# Patient Record
Sex: MALE | Race: WHITE | NOT HISPANIC OR LATINO | Employment: FULL TIME | RURAL
[De-identification: names, ages, dates, MRNs, and addresses within clinical notes are randomized per-mention and may not be internally consistent; named-entity substitution may affect disease eponyms.]

---
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
## 2022-02-17 ENCOUNTER — OFFICE VISIT (OUTPATIENT)
Dept: FAMILY MEDICINE | Facility: CLINIC | Age: 22
End: 2022-02-17
Payer: MEDICAID

## 2022-02-17 VITALS
SYSTOLIC BLOOD PRESSURE: 104 MMHG | HEIGHT: 76 IN | BODY MASS INDEX: 22.53 KG/M2 | RESPIRATION RATE: 20 BRPM | WEIGHT: 185 LBS | TEMPERATURE: 98 F | HEART RATE: 61 BPM | OXYGEN SATURATION: 94 % | DIASTOLIC BLOOD PRESSURE: 60 MMHG

## 2022-02-17 DIAGNOSIS — R09.81 HEAD CONGESTION: ICD-10-CM

## 2022-02-17 DIAGNOSIS — J06.9 UPPER RESPIRATORY TRACT INFECTION, UNSPECIFIED TYPE: Primary | ICD-10-CM

## 2022-02-17 DIAGNOSIS — R09.89 CHEST CONGESTION: ICD-10-CM

## 2022-02-17 DIAGNOSIS — R05.9 COUGH: ICD-10-CM

## 2022-02-17 DIAGNOSIS — J02.9 SORE THROAT: ICD-10-CM

## 2022-02-17 PROCEDURE — 96372 PR INJECTION,THERAP/PROPH/DIAG2ST, IM OR SUBCUT: ICD-10-PCS | Mod: ,,, | Performed by: NURSE PRACTITIONER

## 2022-02-17 PROCEDURE — 3008F BODY MASS INDEX DOCD: CPT | Mod: CPTII,,, | Performed by: NURSE PRACTITIONER

## 2022-02-17 PROCEDURE — 96372 THER/PROPH/DIAG INJ SC/IM: CPT | Mod: ,,, | Performed by: NURSE PRACTITIONER

## 2022-02-17 PROCEDURE — 3074F SYST BP LT 130 MM HG: CPT | Mod: CPTII,,, | Performed by: NURSE PRACTITIONER

## 2022-02-17 PROCEDURE — 99213 PR OFFICE/OUTPT VISIT, EST, LEVL III, 20-29 MIN: ICD-10-PCS | Mod: 25,,, | Performed by: NURSE PRACTITIONER

## 2022-02-17 PROCEDURE — 1159F MED LIST DOCD IN RCRD: CPT | Mod: CPTII,,, | Performed by: NURSE PRACTITIONER

## 2022-02-17 PROCEDURE — 1160F RVW MEDS BY RX/DR IN RCRD: CPT | Mod: CPTII,,, | Performed by: NURSE PRACTITIONER

## 2022-02-17 PROCEDURE — 1160F PR REVIEW ALL MEDS BY PRESCRIBER/CLIN PHARMACIST DOCUMENTED: ICD-10-PCS | Mod: CPTII,,, | Performed by: NURSE PRACTITIONER

## 2022-02-17 PROCEDURE — 1159F PR MEDICATION LIST DOCUMENTED IN MEDICAL RECORD: ICD-10-PCS | Mod: CPTII,,, | Performed by: NURSE PRACTITIONER

## 2022-02-17 PROCEDURE — 3008F PR BODY MASS INDEX (BMI) DOCUMENTED: ICD-10-PCS | Mod: CPTII,,, | Performed by: NURSE PRACTITIONER

## 2022-02-17 PROCEDURE — 3074F PR MOST RECENT SYSTOLIC BLOOD PRESSURE < 130 MM HG: ICD-10-PCS | Mod: CPTII,,, | Performed by: NURSE PRACTITIONER

## 2022-02-17 PROCEDURE — 99213 OFFICE O/P EST LOW 20 MIN: CPT | Mod: 25,,, | Performed by: NURSE PRACTITIONER

## 2022-02-17 PROCEDURE — 3078F DIAST BP <80 MM HG: CPT | Mod: CPTII,,, | Performed by: NURSE PRACTITIONER

## 2022-02-17 PROCEDURE — 3078F PR MOST RECENT DIASTOLIC BLOOD PRESSURE < 80 MM HG: ICD-10-PCS | Mod: CPTII,,, | Performed by: NURSE PRACTITIONER

## 2022-02-17 RX ORDER — AZITHROMYCIN 250 MG/1
TABLET, FILM COATED ORAL
Qty: 6 TABLET | Refills: 0 | Status: SHIPPED | OUTPATIENT
Start: 2022-02-17 | End: 2022-02-22

## 2022-02-17 RX ORDER — CEFTRIAXONE 1 G/1
1 INJECTION, POWDER, FOR SOLUTION INTRAMUSCULAR; INTRAVENOUS
Status: COMPLETED | OUTPATIENT
Start: 2022-02-17 | End: 2022-02-17

## 2022-02-17 RX ORDER — DEXAMETHASONE SODIUM PHOSPHATE 4 MG/ML
4 INJECTION, SOLUTION INTRA-ARTICULAR; INTRALESIONAL; INTRAMUSCULAR; INTRAVENOUS; SOFT TISSUE
Status: COMPLETED | OUTPATIENT
Start: 2022-02-17 | End: 2022-02-17

## 2022-02-17 RX ADMIN — DEXAMETHASONE SODIUM PHOSPHATE 4 MG: 4 INJECTION, SOLUTION INTRA-ARTICULAR; INTRALESIONAL; INTRAMUSCULAR; INTRAVENOUS; SOFT TISSUE at 04:02

## 2022-02-17 RX ADMIN — CEFTRIAXONE 1 G: 1 INJECTION, POWDER, FOR SOLUTION INTRAMUSCULAR; INTRAVENOUS at 04:02

## 2022-02-17 NOTE — LETTER
February 17, 2022      [facility]  [address]  [place] [address]  Phone: [phone]  Fax: [phone]       Patient: Ba Ewing   YOB: 2000  Date of Visit: 02/17/2022    To Whom It May Concern:    Damaris Ewing  was at [facility] on 02/17/2022. The patient may return to work/school on 02/18/22 with no restrictions. If you have any questions or concerns, or if I can be of further assistance, please do not hesitate to contact me.    Sincerely,      GARRETT Lira

## 2022-02-17 NOTE — LETTER
February 17, 2022      [facility]  [address]  [place] [address]  Phone: [phone]  Fax: [phone]       Patient: Ba Ewing   YOB: 2000  Date of Visit: 02/17/2022    To Whom It May Concern:    Damaris Ewing  was at [facility] on 02/17/2022. The patient may return to work/school on 02/18/2022 with no restrictions. If you have any questions or concerns, or if I can be of further assistance, please do not hesitate to contact me.    Sincerely,    Lisa Medina RN

## 2022-02-22 NOTE — PROGRESS NOTES
GARRETT Payne   [facility]  [address] [address] [address]  [phone]      PATIENT NAME: Ba Ewing  : 2000  DATE: 22  MRN: [id number]      Billing Provider: GARRETT Payne  Level of Service:   Patient PCP Information     Provider PCP Type    [facility]RETT [facility]          Reason for Visit / Chief Complaint: Sore Throat (Sick for about the past month or so.), Headache (Denies fever, n/v/d or loss of smell or taste.), and Cough (Productive cough with yellow sputum.)       Update PCP  Update Chief Complaint         History of Present Illness / Problem Focused Workflow       Presents with complaints of sore throat, headache, head congestion, cough for almost a month      Review of Systems     Review of Systems   Constitutional: Negative for chills, fatigue and fever.   HENT: Positive for congestion and sore throat. Negative for ear pain.    Respiratory: Positive for cough. Negative for shortness of breath.    Cardiovascular: Negative for palpitations.   Gastrointestinal: Negative for abdominal pain, diarrhea and nausea.   Musculoskeletal: Negative for gait problem.   Skin: Negative for rash.   Neurological: Negative for dizziness and weakness.   Psychiatric/Behavioral: Negative for dysphoric mood. The patient is not nervous/anxious.        Medical / Social / Family History     Past Medical History:   Diagnosis Date    Asthma     History of COVID-19 2020       Past Surgical History:   Procedure Laterality Date    MOUTH SURGERY         Social History    reports that he has been smoking vaping with nicotine. He has never used smokeless tobacco. He reports current drug use. Drug: Marijuana. He reports that he does not drink alcohol.    Family History  's family history includes Hypertension in his father and mother.    Medications and Allergies     Medications  Outpatient Medications Marked as Taking for the 22 encounter (Office Visit)  with [facility]   Medication Sig Dispense Refill    omeprazole (PRILOSEC) 20 MG capsule Take 20 mg by mouth once daily.         Allergies  Review of patient's allergies indicates:   Allergen Reactions    Amoxicillin-pot clavulanate Other (See Comments)       Physical Examination     Vitals:    02/17/22 1606   BP: 104/60   Pulse: 61   Resp: 20   Temp: 97.9 °F (36.6 °C)     Physical Exam  Constitutional:       General: He is not in acute distress.  HENT:      Head: Normocephalic.      Right Ear: Tympanic membrane normal.      Left Ear: Tympanic membrane normal.      Nose: Congestion present.      Mouth/Throat:      Mouth: Mucous membranes are moist.      Pharynx: No posterior oropharyngeal erythema.   Eyes:      Extraocular Movements: Extraocular movements intact.   Cardiovascular:      Rate and Rhythm: Normal rate.      Heart sounds: Normal heart sounds.   Pulmonary:      Effort: Pulmonary effort is normal. No respiratory distress.   Abdominal:      General: Bowel sounds are normal.   Musculoskeletal:         General: Normal range of motion.      Cervical back: Normal range of motion.   Skin:     General: Skin is warm.   Neurological:      Mental Status: He is alert and oriented to person, place, and time.   Psychiatric:         Behavior: Behavior normal.           Imaging / Labs     No visits with results within 1 Day(s) from this visit.   Latest known visit with results is:   Office Visit on 09/23/2021   Component Date Value Ref Range Status    SARS Coronavirus 2 Antigen 09/23/2021 Negative  Negative Final    Rapid Influenza A Ag 09/23/2021 Negative  Negative Final    Rapid Influenza B Ag 09/23/2021 Negative  Negative Final     Acceptable 09/23/2021 Yes   Final     X-Ray Elbow Complete Left  Narrative: EXAMINATION:  XR ELBOW COMPLETE 3 VIEW LEFT    CLINICAL HISTORY:  Unspecified fall, initial encounter    COMPARISON:  None available    FINDINGS:  No evidence of fracture seen.  The  alignment of the joints appears normal.  No degenerative change is present.  No soft tissue abnormality is seen.  Impression: No evidence of abnormality demonstrated    Electronically signed by: Ryan Clemons  Date:    09/08/2021  Time:    07:52      Assessment and Plan (including Health Maintenance)      Problem List  Smart Sets  Document Outside HM   :    Health Maintenance Due   Topic Date Due    Hepatitis C Screening  Never done    Lipid Panel  Never done    COVID-19 Vaccine (1) Never done    Pneumococcal Vaccines (Age 0-64) (1 of 2 - PPSV23) Never done    HPV Vaccines (1 - Male 2-dose series) Never done    HIV Screening  Never done    TETANUS VACCINE  Never done    Influenza Vaccine (1) 09/01/2021       Problem List Items Addressed This Visit        ENT    Upper respiratory tract infection - Primary    Relevant Medications    azithromycin (Z-NICKY) 250 MG tablet      Other Visit Diagnoses     Sore throat        Relevant Orders    POCT SARS-COV2 (COVID) with Flu Antigen    POCT rapid strep A    Cough        Relevant Medications    dexamethasone injection 4 mg (Completed)    Other Relevant Orders    POCT SARS-COV2 (COVID) with Flu Antigen    Head congestion        Relevant Medications    dexamethasone injection 4 mg (Completed)    Chest congestion        Relevant Medications    dexamethasone injection 4 mg (Completed)        Will treat with decadron IM today. Advised him to call if symptoms change or seem to worsen  Follow up as needed    Signature:  GARRETT Payne  [address] [address]  [phone]    Date of encounter: 2/17/22

## 2022-05-25 ENCOUNTER — HOSPITAL ENCOUNTER (EMERGENCY)
Facility: HOSPITAL | Age: 22
Discharge: HOME | End: 2022-05-25
Payer: MEDICAID

## 2022-05-25 VITALS
SYSTOLIC BLOOD PRESSURE: 125 MMHG | RESPIRATION RATE: 16 BRPM | HEIGHT: 77 IN | HEART RATE: 84 BPM | DIASTOLIC BLOOD PRESSURE: 75 MMHG | BODY MASS INDEX: 21.25 KG/M2 | WEIGHT: 180 LBS | TEMPERATURE: 98 F | OXYGEN SATURATION: 100 %

## 2022-05-25 DIAGNOSIS — N39.0 URINARY TRACT INFECTION WITHOUT HEMATURIA, SITE UNSPECIFIED: Primary | ICD-10-CM

## 2022-05-25 LAB
AMORPH PHOS CRY #/AREA URNS LPF: ABNORMAL /LPF
BACTERIA #/AREA URNS HPF: ABNORMAL /HPF
BILIRUB UR QL STRIP: NEGATIVE
CLARITY UR: CLEAR
COLOR UR: YELLOW
GLUCOSE UR STRIP-MCNC: NEGATIVE MG/DL
KETONES UR STRIP-SCNC: NEGATIVE MG/DL
LEUKOCYTE ESTERASE UR QL STRIP: ABNORMAL
NITRITE UR QL STRIP: POSITIVE
PH UR STRIP: 7.5 PH UNITS
PROT UR QL STRIP: NEGATIVE
RBC # UR STRIP: NEGATIVE /UL
RBC #/AREA URNS HPF: ABNORMAL /HPF
SP GR UR STRIP: 1.01
SQUAMOUS #/AREA URNS LPF: ABNORMAL /LPF
UROBILINOGEN UR STRIP-ACNC: 0.2 MG/DL
WBC #/AREA URNS HPF: ABNORMAL /HPF

## 2022-05-25 PROCEDURE — 99283 PR EMERGENCY DEPT VISIT,LEVEL III: ICD-10-PCS | Mod: ,,, | Performed by: REGISTERED NURSE

## 2022-05-25 PROCEDURE — 99283 EMERGENCY DEPT VISIT LOW MDM: CPT | Mod: ,,, | Performed by: REGISTERED NURSE

## 2022-05-25 PROCEDURE — 96372 THER/PROPH/DIAG INJ SC/IM: CPT

## 2022-05-25 PROCEDURE — 87491 CHLMYD TRACH DNA AMP PROBE: CPT | Performed by: REGISTERED NURSE

## 2022-05-25 PROCEDURE — 87591 N.GONORRHOEAE DNA AMP PROB: CPT | Performed by: REGISTERED NURSE

## 2022-05-25 PROCEDURE — 81001 URINALYSIS AUTO W/SCOPE: CPT | Performed by: REGISTERED NURSE

## 2022-05-25 PROCEDURE — 63600175 PHARM REV CODE 636 W HCPCS: Performed by: REGISTERED NURSE

## 2022-05-25 PROCEDURE — 99284 EMERGENCY DEPT VISIT MOD MDM: CPT

## 2022-05-25 RX ORDER — DOXYCYCLINE 100 MG/1
100 CAPSULE ORAL 2 TIMES DAILY
Qty: 14 CAPSULE | Refills: 0 | Status: SHIPPED | OUTPATIENT
Start: 2022-05-25 | End: 2022-06-01

## 2022-05-25 RX ORDER — CEFTRIAXONE 500 MG/1
500 INJECTION, POWDER, FOR SOLUTION INTRAMUSCULAR; INTRAVENOUS
Status: COMPLETED | OUTPATIENT
Start: 2022-05-25 | End: 2022-05-25

## 2022-05-25 RX ADMIN — CEFTRIAXONE 500 MG: 500 INJECTION, POWDER, FOR SOLUTION INTRAMUSCULAR; INTRAVENOUS at 08:05

## 2022-05-26 NOTE — ED TRIAGE NOTES
C/O paina nd itching with urination x 1 month.  Noted clear discharge from urethra.  Denies passing any blood.

## 2022-05-27 ENCOUNTER — TELEPHONE (OUTPATIENT)
Dept: EMERGENCY MEDICINE | Facility: HOSPITAL | Age: 22
End: 2022-05-27
Payer: MEDICAID

## 2022-05-27 LAB
CHLAMYDIA BY PCR: POSITIVE
N. GONORRHOEAE (GC) BY PCR: NEGATIVE

## 2022-10-05 ENCOUNTER — HOSPITAL ENCOUNTER (EMERGENCY)
Facility: HOSPITAL | Age: 22
Discharge: HOME | End: 2022-10-05
Payer: MEDICAID

## 2022-10-05 VITALS
DIASTOLIC BLOOD PRESSURE: 78 MMHG | WEIGHT: 175 LBS | TEMPERATURE: 98 F | OXYGEN SATURATION: 99 % | SYSTOLIC BLOOD PRESSURE: 128 MMHG | HEART RATE: 70 BPM | BODY MASS INDEX: 20.66 KG/M2 | HEIGHT: 77 IN | RESPIRATION RATE: 16 BRPM

## 2022-10-05 DIAGNOSIS — M79.642 LEFT HAND PAIN: ICD-10-CM
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
## 2022-10-05 DIAGNOSIS — S51.812A LACERATION OF LEFT FOREARM, INITIAL ENCOUNTER: ICD-10-CM

## 2022-10-05 DIAGNOSIS — S61.412A LACERATION OF LEFT HAND WITHOUT FOREIGN BODY, INITIAL ENCOUNTER: Primary | ICD-10-CM

## 2022-10-05 DIAGNOSIS — S40.812A ABRASION OF MULTIPLE SITES OF LEFT UPPER ARM, INITIAL ENCOUNTER: ICD-10-CM

## 2022-10-05 DIAGNOSIS — M79.602 LEFT ARM PAIN: ICD-10-CM

## 2022-10-05 PROCEDURE — 63600175 PHARM REV CODE 636 W HCPCS: Performed by: NURSE PRACTITIONER

## 2022-10-05 PROCEDURE — 99283 EMERGENCY DEPT VISIT LOW MDM: CPT | Mod: 25,,, | Performed by: NURSE PRACTITIONER

## 2022-10-05 PROCEDURE — 12002 RPR S/N/AX/GEN/TRNK2.6-7.5CM: CPT

## 2022-10-05 PROCEDURE — 25000003 PHARM REV CODE 250: Performed by: NURSE PRACTITIONER

## 2022-10-05 PROCEDURE — 90715 TDAP VACCINE 7 YRS/> IM: CPT | Performed by: NURSE PRACTITIONER

## 2022-10-05 PROCEDURE — 99283 PR EMERGENCY DEPT VISIT,LEVEL III: ICD-10-PCS | Mod: 25,,, | Performed by: NURSE PRACTITIONER

## 2022-10-05 PROCEDURE — 12002 PR RESUP NPTERF WND BODY 2.6-7.5 CM: ICD-10-PCS | Mod: ,,, | Performed by: NURSE PRACTITIONER

## 2022-10-05 PROCEDURE — 99285 EMERGENCY DEPT VISIT HI MDM: CPT | Mod: 25

## 2022-10-05 PROCEDURE — 90471 IMMUNIZATION ADMIN: CPT | Performed by: NURSE PRACTITIONER

## 2022-10-05 PROCEDURE — 12002 RPR S/N/AX/GEN/TRNK2.6-7.5CM: CPT | Mod: ,,, | Performed by: NURSE PRACTITIONER

## 2022-10-05 RX ORDER — LIDOCAINE HYDROCHLORIDE 10 MG/ML
5 INJECTION, SOLUTION EPIDURAL; INFILTRATION; INTRACAUDAL; PERINEURAL
Status: COMPLETED | OUTPATIENT
Start: 2022-10-05 | End: 2022-10-05

## 2022-10-05 RX ORDER — CLINDAMYCIN HYDROCHLORIDE 150 MG/1
300 CAPSULE ORAL EVERY 8 HOURS
Qty: 42 CAPSULE | Refills: 0 | Status: SHIPPED | OUTPATIENT
Start: 2022-10-05 | End: 2022-10-12

## 2022-10-05 RX ORDER — TRAMADOL HYDROCHLORIDE 50 MG/1
50 TABLET ORAL
Status: COMPLETED | OUTPATIENT
Start: 2022-10-05 | End: 2022-10-05

## 2022-10-05 RX ORDER — KETOROLAC TROMETHAMINE 10 MG/1
10 TABLET, FILM COATED ORAL EVERY 6 HOURS PRN
Qty: 20 TABLET | Refills: 0 | Status: SHIPPED | OUTPATIENT
Start: 2022-10-05 | End: 2022-10-10

## 2022-10-05 RX ADMIN — TETANUS TOXOID, REDUCED DIPHTHERIA TOXOID AND ACELLULAR PERTUSSIS VACCINE, ADSORBED 0.5 ML: 5; 2.5; 8; 8; 2.5 SUSPENSION INTRAMUSCULAR at 01:10

## 2022-10-05 RX ADMIN — TRAMADOL HYDROCHLORIDE 50 MG: 50 TABLET, COATED ORAL at 01:10

## 2022-10-05 RX ADMIN — LIDOCAINE HYDROCHLORIDE 50 MG: 10 INJECTION, SOLUTION EPIDURAL; INFILTRATION; INTRACAUDAL; PERINEURAL at 01:10

## 2022-10-05 NOTE — DISCHARGE INSTRUCTIONS
Take prescriptions as directed. Alternate with tylenol as needed for pain. Keep wound clean and dry. Wash with antibacterial soap and water twice a day. Do not pull steri strips, they will come off on their own. You may return in 2 days for a wound check. Return in 7 days for suture removal. Return to the ED for worsening signs and symptoms or otherwise as needed.
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
## 2022-10-06 ENCOUNTER — TELEPHONE (OUTPATIENT)
Dept: EMERGENCY MEDICINE | Facility: HOSPITAL | Age: 22
End: 2022-10-06
Payer: MEDICAID

## 2022-10-07 ENCOUNTER — OFFICE VISIT (OUTPATIENT)
Dept: FAMILY MEDICINE | Facility: CLINIC | Age: 22
End: 2022-10-07
Payer: MEDICAID

## 2022-10-07 VITALS
HEART RATE: 65 BPM | DIASTOLIC BLOOD PRESSURE: 82 MMHG | TEMPERATURE: 98 F | RESPIRATION RATE: 18 BRPM | OXYGEN SATURATION: 99 % | BODY MASS INDEX: 21.33 KG/M2 | HEIGHT: 77 IN | WEIGHT: 180.63 LBS | SYSTOLIC BLOOD PRESSURE: 130 MMHG

## 2022-10-07 DIAGNOSIS — S41.112D LACERATION OF MULTIPLE SITES OF LEFT UPPER EXTREMITY, SUBSEQUENT ENCOUNTER: Primary | ICD-10-CM

## 2022-10-07 PROCEDURE — 1159F PR MEDICATION LIST DOCUMENTED IN MEDICAL RECORD: ICD-10-PCS | Mod: CPTII,,, | Performed by: FAMILY MEDICINE

## 2022-10-07 PROCEDURE — 99213 PR OFFICE/OUTPT VISIT, EST, LEVL III, 20-29 MIN: ICD-10-PCS | Mod: ,,, | Performed by: FAMILY MEDICINE

## 2022-10-07 PROCEDURE — 3079F PR MOST RECENT DIASTOLIC BLOOD PRESSURE 80-89 MM HG: ICD-10-PCS | Mod: CPTII,,, | Performed by: FAMILY MEDICINE

## 2022-10-07 PROCEDURE — 1159F MED LIST DOCD IN RCRD: CPT | Mod: CPTII,,, | Performed by: FAMILY MEDICINE

## 2022-10-07 PROCEDURE — 3008F PR BODY MASS INDEX (BMI) DOCUMENTED: ICD-10-PCS | Mod: CPTII,,, | Performed by: FAMILY MEDICINE

## 2022-10-07 PROCEDURE — 3075F SYST BP GE 130 - 139MM HG: CPT | Mod: CPTII,,, | Performed by: FAMILY MEDICINE

## 2022-10-07 PROCEDURE — 99213 OFFICE O/P EST LOW 20 MIN: CPT | Mod: ,,, | Performed by: FAMILY MEDICINE

## 2022-10-07 PROCEDURE — 3075F PR MOST RECENT SYSTOLIC BLOOD PRESS GE 130-139MM HG: ICD-10-PCS | Mod: CPTII,,, | Performed by: FAMILY MEDICINE

## 2022-10-07 PROCEDURE — 3079F DIAST BP 80-89 MM HG: CPT | Mod: CPTII,,, | Performed by: FAMILY MEDICINE

## 2022-10-07 PROCEDURE — 3008F BODY MASS INDEX DOCD: CPT | Mod: CPTII,,, | Performed by: FAMILY MEDICINE

## 2022-10-11 PROBLEM — S41.112A LACERATIONS OF MULTIPLE SITES OF LEFT ARM: Status: ACTIVE | Noted: 2022-10-11

## 2022-10-12 ENCOUNTER — OFFICE VISIT (OUTPATIENT)
Dept: FAMILY MEDICINE | Facility: CLINIC | Age: 22
End: 2022-10-12
Payer: MEDICAID

## 2022-10-12 VITALS
OXYGEN SATURATION: 99 % | BODY MASS INDEX: 21.02 KG/M2 | SYSTOLIC BLOOD PRESSURE: 122 MMHG | DIASTOLIC BLOOD PRESSURE: 70 MMHG | RESPIRATION RATE: 19 BRPM | TEMPERATURE: 98 F | HEIGHT: 77 IN | WEIGHT: 178 LBS | HEART RATE: 67 BPM

## 2022-10-12 DIAGNOSIS — S41.112S: Primary | ICD-10-CM

## 2022-10-12 PROCEDURE — 1159F MED LIST DOCD IN RCRD: CPT | Mod: CPTII,,, | Performed by: FAMILY MEDICINE

## 2022-10-12 PROCEDURE — 3008F BODY MASS INDEX DOCD: CPT | Mod: CPTII,,, | Performed by: FAMILY MEDICINE

## 2022-10-12 PROCEDURE — 99213 PR OFFICE/OUTPT VISIT, EST, LEVL III, 20-29 MIN: ICD-10-PCS | Mod: ,,, | Performed by: FAMILY MEDICINE

## 2022-10-12 PROCEDURE — 3074F PR MOST RECENT SYSTOLIC BLOOD PRESSURE < 130 MM HG: ICD-10-PCS | Mod: CPTII,,, | Performed by: FAMILY MEDICINE

## 2022-10-12 PROCEDURE — 3078F DIAST BP <80 MM HG: CPT | Mod: CPTII,,, | Performed by: FAMILY MEDICINE

## 2022-10-12 PROCEDURE — 3074F SYST BP LT 130 MM HG: CPT | Mod: CPTII,,, | Performed by: FAMILY MEDICINE

## 2022-10-12 PROCEDURE — 3078F PR MOST RECENT DIASTOLIC BLOOD PRESSURE < 80 MM HG: ICD-10-PCS | Mod: CPTII,,, | Performed by: FAMILY MEDICINE

## 2022-10-12 PROCEDURE — 1159F PR MEDICATION LIST DOCUMENTED IN MEDICAL RECORD: ICD-10-PCS | Mod: CPTII,,, | Performed by: FAMILY MEDICINE

## 2022-10-12 PROCEDURE — 3008F PR BODY MASS INDEX (BMI) DOCUMENTED: ICD-10-PCS | Mod: CPTII,,, | Performed by: FAMILY MEDICINE

## 2022-10-12 PROCEDURE — 99213 OFFICE O/P EST LOW 20 MIN: CPT | Mod: ,,, | Performed by: FAMILY MEDICINE
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
## 2022-10-12 NOTE — ASSESSMENT & PLAN NOTE
Lacerations healing with the sutures removed from 3 areas.  He still has macerated open lacerations on the dorsum of his left hand.  No evidence of infection.  He continues antibiotics.  Follow-up here in 1 week.  Daily wound care with the Neosporin and Telfa.

## 2022-10-12 NOTE — ASSESSMENT & PLAN NOTE
Multiple superficial lacerations and abrasions to the left arm.  Wounds are currently stable and do not appear to be infected.  Will maintain him on clindamycin and treat the wounds daily with the wound dressings.  We did the clean the wounds up in the clinic today.  In redressed his wounds.  Continue Toradol for pain and clindamycin every 8 hours.  Follow-up in 1 week or p.r.n.

## 2022-10-19 ENCOUNTER — OFFICE VISIT (OUTPATIENT)
Dept: FAMILY MEDICINE | Facility: CLINIC | Age: 22
End: 2022-10-19
Payer: MEDICAID

## 2022-10-19 VITALS
SYSTOLIC BLOOD PRESSURE: 116 MMHG | TEMPERATURE: 99 F | RESPIRATION RATE: 16 BRPM | BODY MASS INDEX: 20.66 KG/M2 | OXYGEN SATURATION: 99 % | DIASTOLIC BLOOD PRESSURE: 72 MMHG | WEIGHT: 175 LBS | HEART RATE: 68 BPM | HEIGHT: 77 IN

## 2022-10-19 DIAGNOSIS — S41.112S: ICD-10-CM

## 2022-10-19 DIAGNOSIS — S16.1XXD STRAIN OF NECK MUSCLE, SUBSEQUENT ENCOUNTER: Primary | ICD-10-CM

## 2022-10-19 PROBLEM — S16.1XXA CERVICAL STRAIN: Status: ACTIVE | Noted: 2022-10-19

## 2022-10-19 PROCEDURE — 1159F MED LIST DOCD IN RCRD: CPT | Mod: CPTII,,, | Performed by: FAMILY MEDICINE

## 2022-10-19 PROCEDURE — 3078F DIAST BP <80 MM HG: CPT | Mod: CPTII,,, | Performed by: FAMILY MEDICINE

## 2022-10-19 PROCEDURE — 1159F PR MEDICATION LIST DOCUMENTED IN MEDICAL RECORD: ICD-10-PCS | Mod: CPTII,,, | Performed by: FAMILY MEDICINE

## 2022-10-19 PROCEDURE — 99214 OFFICE O/P EST MOD 30 MIN: CPT | Mod: ,,, | Performed by: FAMILY MEDICINE

## 2022-10-19 PROCEDURE — 3074F PR MOST RECENT SYSTOLIC BLOOD PRESSURE < 130 MM HG: ICD-10-PCS | Mod: CPTII,,, | Performed by: FAMILY MEDICINE

## 2022-10-19 PROCEDURE — 1160F PR REVIEW ALL MEDS BY PRESCRIBER/CLIN PHARMACIST DOCUMENTED: ICD-10-PCS | Mod: CPTII,,, | Performed by: FAMILY MEDICINE

## 2022-10-19 PROCEDURE — 1160F RVW MEDS BY RX/DR IN RCRD: CPT | Mod: CPTII,,, | Performed by: FAMILY MEDICINE

## 2022-10-19 PROCEDURE — 3078F PR MOST RECENT DIASTOLIC BLOOD PRESSURE < 80 MM HG: ICD-10-PCS | Mod: CPTII,,, | Performed by: FAMILY MEDICINE

## 2022-10-19 PROCEDURE — 99214 PR OFFICE/OUTPT VISIT, EST, LEVL IV, 30-39 MIN: ICD-10-PCS | Mod: ,,, | Performed by: FAMILY MEDICINE

## 2022-10-19 PROCEDURE — 3074F SYST BP LT 130 MM HG: CPT | Mod: CPTII,,, | Performed by: FAMILY MEDICINE

## 2022-10-19 RX ORDER — DICLOFENAC SODIUM 50 MG/1
50 TABLET, DELAYED RELEASE ORAL 2 TIMES DAILY
Qty: 20 TABLET | Refills: 1 | Status: SHIPPED | OUTPATIENT
Start: 2022-10-19

## 2022-10-19 RX ORDER — METHOCARBAMOL 500 MG/1
500 TABLET, FILM COATED ORAL 4 TIMES DAILY
Qty: 40 TABLET | Refills: 0 | Status: SHIPPED | OUTPATIENT
Start: 2022-10-19 | End: 2022-10-29
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
## 2022-11-14 ENCOUNTER — OFFICE VISIT (OUTPATIENT)
Dept: FAMILY MEDICINE | Facility: CLINIC | Age: 22
End: 2022-11-14
Payer: MEDICAID

## 2022-11-14 VITALS
OXYGEN SATURATION: 97 % | SYSTOLIC BLOOD PRESSURE: 124 MMHG | TEMPERATURE: 98 F | WEIGHT: 174 LBS | HEIGHT: 77 IN | HEART RATE: 83 BPM | DIASTOLIC BLOOD PRESSURE: 84 MMHG | BODY MASS INDEX: 20.55 KG/M2

## 2022-11-14 DIAGNOSIS — M62.838 CERVICAL PARASPINAL MUSCLE SPASM: ICD-10-CM

## 2022-11-14 DIAGNOSIS — Z72.51 HIGH RISK HETEROSEXUAL BEHAVIOR: Primary | ICD-10-CM

## 2022-11-14 PROCEDURE — 87661 TRICHOMONAS VAGINALIS BY PCR: ICD-10-PCS | Mod: ,,, | Performed by: CLINICAL MEDICAL LABORATORY

## 2022-11-14 PROCEDURE — 1159F MED LIST DOCD IN RCRD: CPT | Mod: CPTII,,, | Performed by: NURSE PRACTITIONER

## 2022-11-14 PROCEDURE — 3079F DIAST BP 80-89 MM HG: CPT | Mod: CPTII,,, | Performed by: NURSE PRACTITIONER

## 2022-11-14 PROCEDURE — 1159F PR MEDICATION LIST DOCUMENTED IN MEDICAL RECORD: ICD-10-PCS | Mod: CPTII,,, | Performed by: NURSE PRACTITIONER

## 2022-11-14 PROCEDURE — 1160F PR REVIEW ALL MEDS BY PRESCRIBER/CLIN PHARMACIST DOCUMENTED: ICD-10-PCS | Mod: CPTII,,, | Performed by: NURSE PRACTITIONER

## 2022-11-14 PROCEDURE — 87661 TRICHOMONAS VAGINALIS AMPLIF: CPT | Mod: ,,, | Performed by: CLINICAL MEDICAL LABORATORY

## 2022-11-14 PROCEDURE — 87491 CHLMYD TRACH DNA AMP PROBE: CPT | Mod: ,,, | Performed by: CLINICAL MEDICAL LABORATORY

## 2022-11-14 PROCEDURE — 87591 N.GONORRHOEAE DNA AMP PROB: CPT | Mod: ,,, | Performed by: CLINICAL MEDICAL LABORATORY

## 2022-11-14 PROCEDURE — 3074F PR MOST RECENT SYSTOLIC BLOOD PRESSURE < 130 MM HG: ICD-10-PCS | Mod: CPTII,,, | Performed by: NURSE PRACTITIONER

## 2022-11-14 PROCEDURE — 87591 CHLAMYDIA/GONORRHOEAE(GC), PCR: ICD-10-PCS | Mod: ,,, | Performed by: CLINICAL MEDICAL LABORATORY

## 2022-11-14 PROCEDURE — 3074F SYST BP LT 130 MM HG: CPT | Mod: CPTII,,, | Performed by: NURSE PRACTITIONER

## 2022-11-14 PROCEDURE — 3008F PR BODY MASS INDEX (BMI) DOCUMENTED: ICD-10-PCS | Mod: CPTII,,, | Performed by: NURSE PRACTITIONER

## 2022-11-14 PROCEDURE — 3079F PR MOST RECENT DIASTOLIC BLOOD PRESSURE 80-89 MM HG: ICD-10-PCS | Mod: CPTII,,, | Performed by: NURSE PRACTITIONER

## 2022-11-14 PROCEDURE — 1160F RVW MEDS BY RX/DR IN RCRD: CPT | Mod: CPTII,,, | Performed by: NURSE PRACTITIONER

## 2022-11-14 PROCEDURE — 99213 OFFICE O/P EST LOW 20 MIN: CPT | Mod: ,,, | Performed by: NURSE PRACTITIONER

## 2022-11-14 PROCEDURE — 87491 CHLAMYDIA/GONORRHOEAE(GC), PCR: ICD-10-PCS | Mod: ,,, | Performed by: CLINICAL MEDICAL LABORATORY

## 2022-11-14 PROCEDURE — 3008F BODY MASS INDEX DOCD: CPT | Mod: CPTII,,, | Performed by: NURSE PRACTITIONER

## 2022-11-14 PROCEDURE — 99213 PR OFFICE/OUTPT VISIT, EST, LEVL III, 20-29 MIN: ICD-10-PCS | Mod: ,,, | Performed by: NURSE PRACTITIONER

## 2022-11-14 RX ORDER — BACLOFEN 10 MG/1
10 TABLET ORAL 3 TIMES DAILY
Qty: 90 TABLET | Refills: 0 | Status: SHIPPED | OUTPATIENT
Start: 2022-11-14 | End: 2023-11-14
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
## 2022-11-14 NOTE — ASSESSMENT & PLAN NOTE
GC/Chlamydia, Trich testing done today.   Instructed we should have results tomorrow.  Safer sex practices encouraged using a barrier method. Avoid sexual activities until symptoms subside and test results are received. Instructed to follow-up if symptoms persist past treatment plan or worsen to return to clinic for further evaluation and treatment.

## 2022-11-14 NOTE — ASSESSMENT & PLAN NOTE
Baclofen TID as needed. Patient states he is not taking Diclofenac anymore as it did not seem to help and never even picked up Robaxin. Recommended aspercreme with Lidocaine and gentle stretching.

## 2022-11-15 LAB
CHLAMYDIA BY PCR: NEGATIVE
N. GONORRHOEAE (GC) BY PCR: NEGATIVE
TRICHOMONAS NAT: NEGATIVE

## 2022-11-15 NOTE — PROGRESS NOTES
Labs reviewed: Negative for Trich, Chlamydia, and Gonorrhea. Please contact patient and notify of findings and encourage him to continue safe sex practices as discussed yesterday. Thanks

## 2023-02-22 NOTE — ED PROVIDER NOTES
"Encounter Date: 5/25/2022       History     Chief Complaint   Patient presents with    Dysuria     21y-old AAM received to room 1 with complaint of penile discharge and "UTI" for the past month. Patient states that discharge is "clear." Also c/o pain/itching to penis. Patient reports 1 sexual partner without use of protection.         Review of patient's allergies indicates:   Allergen Reactions    Amoxicillin-pot clavulanate Other (See Comments)     Past Medical History:   Diagnosis Date    Asthma     History of COVID-19 07/20/2020     Past Surgical History:   Procedure Laterality Date    MOUTH SURGERY       Family History   Problem Relation Age of Onset    Hypertension Mother     Hypertension Father      Social History     Tobacco Use    Smoking status: Current Some Day Smoker     Types: Vaping with nicotine    Smokeless tobacco: Never Used   Substance Use Topics    Alcohol use: Not Currently    Drug use: Not Currently     Types: Marijuana     Review of Systems   Constitutional: Negative.    HENT: Negative.    Eyes: Negative.    Respiratory: Negative.    Cardiovascular: Negative.    Gastrointestinal: Negative.    Endocrine: Negative.    Genitourinary: Positive for dysuria, flank pain, frequency, penile discharge, penile pain and urgency. Negative for difficulty urinating and enuresis.   Skin: Negative.    Allergic/Immunologic: Negative.    Neurological: Negative.    Hematological: Negative.    Psychiatric/Behavioral: Negative.        Physical Exam     Initial Vitals [05/25/22 1938]   BP Pulse Resp Temp SpO2   125/75 84 16 98.1 °F (36.7 °C) 100 %      MAP       --         Physical Exam    Nursing note and vitals reviewed.  Constitutional: He appears well-developed and well-nourished.   HENT:   Head: Normocephalic and atraumatic.   Right Ear: External ear normal.   Left Ear: External ear normal.   Nose: Nose normal.   Mouth/Throat: Oropharynx is clear and moist.   Eyes: Conjunctivae are normal.   Neck: " Neck supple.   Normal range of motion.  Cardiovascular: Normal rate, regular rhythm, normal heart sounds and intact distal pulses.   Pulmonary/Chest: Breath sounds normal.   Abdominal: Abdomen is soft. Bowel sounds are normal.   Musculoskeletal:         General: Normal range of motion.      Cervical back: Normal range of motion and neck supple.     Neurological: He is alert and oriented to person, place, and time. He has normal strength. GCS score is 15. GCS eye subscore is 4. GCS verbal subscore is 5. GCS motor subscore is 6.   Skin: Skin is warm and dry. Capillary refill takes less than 2 seconds.         Medical Screening Exam   See Full Note    ED Course   Procedures  Labs Reviewed   URINALYSIS, REFLEX TO URINE CULTURE - Abnormal; Notable for the following components:       Result Value    Leukocytes, UA Trace (*)     Nitrites, UA Positive (*)     All other components within normal limits   URINALYSIS, MICROSCOPIC - Abnormal; Notable for the following components:    Bacteria, UA Occasional (*)     Amorphous Crystals, UA Few (*)     All other components within normal limits   CHLAMYDIA/GONORRHOEAE(GC), PCR          Imaging Results    None          Medications   cefTRIAXone injection 500 mg (has no administration in time range)     Medical Decision Making:   ED Management:  Urine is nitrite/leukocyte positive. I strongly suggest STD GC/chlamydia which will not be available as it is a send out. Will treat empirically with instructions to f/u with his PCP or the health department if symptoms persist or fail to improve. Also discussed treatment of sexual partners. Patient verbalizes understanding and agrees with plan.                    Clinical Impression:   Final diagnoses:  [N39.0] Urinary tract infection without hematuria, site unspecified (Primary)          ED Disposition Condition    Discharge Stable        ED Prescriptions     Medication Sig Dispense Start Date End Date Auth. Provider    doxycycline  (VIBRAMYCIN) 100 MG Cap Take 1 capsule (100 mg total) by mouth 2 (two) times daily. for 7 days 14 capsule 5/25/2022 6/1/2022 TEDDY Zacarias        Follow-up Information     Follow up With Specialties Details Why Contact Info    GARRETT Jacobs [facility]   [address]  [facility] [place] [address]  [phone]             TEDDY Zacarias  05/25/22 2024     22-Feb-2023 15:06

## 2023-03-28 ENCOUNTER — OFFICE VISIT (OUTPATIENT)
Dept: FAMILY MEDICINE | Facility: CLINIC | Age: 23
End: 2023-03-28
Payer: MEDICAID

## 2023-03-28 VITALS
RESPIRATION RATE: 16 BRPM | HEIGHT: 77 IN | HEART RATE: 75 BPM | SYSTOLIC BLOOD PRESSURE: 138 MMHG | BODY MASS INDEX: 20.35 KG/M2 | TEMPERATURE: 98 F | OXYGEN SATURATION: 97 % | DIASTOLIC BLOOD PRESSURE: 88 MMHG | WEIGHT: 172.38 LBS

## 2023-03-28 DIAGNOSIS — R30.0 DYSURIA: ICD-10-CM

## 2023-03-28 DIAGNOSIS — Z11.59 ENCOUNTER FOR SCREENING FOR OTHER VIRAL DISEASES: Primary | ICD-10-CM

## 2023-03-28 DIAGNOSIS — R31.9 HEMATURIA, UNSPECIFIED TYPE: ICD-10-CM

## 2023-03-28 DIAGNOSIS — R10.9 FLANK PAIN: ICD-10-CM

## 2023-03-28 LAB
BILIRUB SERPL-MCNC: NEGATIVE MG/DL
BLOOD URINE, POC: NEGATIVE
COLOR, POC UA: YELLOW
GLUCOSE UR QL STRIP: NEGATIVE
KETONES UR QL STRIP: NORMAL
LEUKOCYTE ESTERASE URINE, POC: NEGATIVE
NITRITE, POC UA: NEGATIVE
PH, POC UA: 6
PROTEIN, POC: NEGATIVE
SPECIFIC GRAVITY, POC UA: 1.02
UROBILINOGEN, POC UA: 0.2

## 2023-03-28 PROCEDURE — 87491 CHLMYD TRACH DNA AMP PROBE: CPT | Mod: ,,, | Performed by: CLINICAL MEDICAL LABORATORY

## 2023-03-28 PROCEDURE — 87491 CHLAMYDIA/GONORRHOEAE(GC), PCR: ICD-10-PCS | Mod: ,,, | Performed by: CLINICAL MEDICAL LABORATORY

## 2023-03-28 PROCEDURE — 3008F BODY MASS INDEX DOCD: CPT | Mod: CPTII,,, | Performed by: NURSE PRACTITIONER

## 2023-03-28 PROCEDURE — 87591 N.GONORRHOEAE DNA AMP PROB: CPT | Mod: ,,, | Performed by: CLINICAL MEDICAL LABORATORY

## 2023-03-28 PROCEDURE — 3079F DIAST BP 80-89 MM HG: CPT | Mod: CPTII,,, | Performed by: NURSE PRACTITIONER

## 2023-03-28 PROCEDURE — 81003 URINALYSIS AUTO W/O SCOPE: CPT | Mod: RHCUB | Performed by: NURSE PRACTITIONER

## 2023-03-28 PROCEDURE — 3075F SYST BP GE 130 - 139MM HG: CPT | Mod: CPTII,,, | Performed by: NURSE PRACTITIONER

## 2023-03-28 PROCEDURE — 3075F PR MOST RECENT SYSTOLIC BLOOD PRESS GE 130-139MM HG: ICD-10-PCS | Mod: CPTII,,, | Performed by: NURSE PRACTITIONER

## 2023-03-28 PROCEDURE — 99213 PR OFFICE/OUTPT VISIT, EST, LEVL III, 20-29 MIN: ICD-10-PCS | Mod: ,,, | Performed by: NURSE PRACTITIONER

## 2023-03-28 PROCEDURE — 3008F PR BODY MASS INDEX (BMI) DOCUMENTED: ICD-10-PCS | Mod: CPTII,,, | Performed by: NURSE PRACTITIONER

## 2023-03-28 PROCEDURE — 99213 OFFICE O/P EST LOW 20 MIN: CPT | Mod: ,,, | Performed by: NURSE PRACTITIONER

## 2023-03-28 PROCEDURE — 87591 CHLAMYDIA/GONORRHOEAE(GC), PCR: ICD-10-PCS | Mod: ,,, | Performed by: CLINICAL MEDICAL LABORATORY

## 2023-03-28 PROCEDURE — 3079F PR MOST RECENT DIASTOLIC BLOOD PRESSURE 80-89 MM HG: ICD-10-PCS | Mod: CPTII,,, | Performed by: NURSE PRACTITIONER

## 2023-03-28 NOTE — PROGRESS NOTES
03/28/23 0923   Depression Patient Health Questionnaire (PHQ-2)   Over the last two weeks how often have you been bothered by little interest or pleasure in doing things 0   Over the last two weeks how often have you been bothered by feeling down, depressed or hopeless 0   PHQ-2 Total Score 0   Depression Patient Health Questionnaire (PHQ-9)   Over the last two weeks how often have you been bothered by trouble falling or staying asleep, or sleeping too much 1   Over the last two weeks how often have you been bothered by feeling tired or having little energy 0   Over the last two weeks how often have you been bothered by a poor appetite or overeating 1   Over the last two weeks how often have you been bothered by feeling bad about yourself - or that you are a failure or have let yourself or your family down 0   Over the last two weeks how often have you been bothered by trouble concentrating on things, such as reading the newspaper or watching television 0   Over the last two weeks how often have you been bothered by moving or speaking so slowly that other people could have noticed. Or the opposite - being so fidgety or restless that you have been moving around a lot more than usual. 0   Over the last two weeks how often have you been bothered by thoughts that you would be better off dead, or of hurting yourself 0   If you checked off any problems, how difficult have these problems made it for you to do your work, take care of things at home or get along with other people? Not difficult at all   PHQ-9 Score 2   PHQ-9 Interpretation Minimal or None

## 2023-03-28 NOTE — LETTER
March 28, 2023      [facility]  [address]  [place] [address]  Phone: [phone]  Fax: [phone]       Patient: Ba Ewing   YOB: 2000  Date of Visit: 03/28/2023    To Whom It May Concern:    Damaris Ewing  was at [facility] on 03/28/2023. The patient may return to work/school on 3/29/23 with no restrictions. If you have any questions or concerns, or if I can be of further assistance, please do not hesitate to contact me.    Sincerely,    Ashley Melo NP

## 2023-03-30 LAB
CHLAMYDIA BY PCR: NEGATIVE
N. GONORRHOEAE (GC) BY PCR: NEGATIVE

## 2023-03-30 NOTE — PROGRESS NOTES
Labs reviewed: Please contact patient and notify he was negative for chlamydia and gonorrhea. If symptoms persist follow up in clinic.

## 2023-04-03 ENCOUNTER — TELEPHONE (OUTPATIENT)
Dept: FAMILY MEDICINE | Facility: CLINIC | Age: 23
End: 2023-04-03
Payer: MEDICAID

## 2023-04-03 NOTE — TELEPHONE ENCOUNTER
Patient called reporting that he still has all the symptoms of chlamydia like he did when he was positive last time. He was wanting medication called in. I told patient that his test for chlamydia and GC came back negative and Ashley said to return to clinic if he was still having symptoms. I explained to patient that it could be something else and he would need a visit to discuss. He reports he will try to come in tomorrow. I told him it is best to be here right at 8am or right after lunch as a walk-in. He voiced understanding.

## 2023-04-05 ENCOUNTER — OFFICE VISIT (OUTPATIENT)
Dept: FAMILY MEDICINE | Facility: CLINIC | Age: 23
End: 2023-04-05
Payer: MEDICAID

## 2023-04-05 VITALS
RESPIRATION RATE: 16 BRPM | TEMPERATURE: 98 F | DIASTOLIC BLOOD PRESSURE: 88 MMHG | OXYGEN SATURATION: 98 % | WEIGHT: 168.38 LBS | BODY MASS INDEX: 19.88 KG/M2 | HEIGHT: 77 IN | SYSTOLIC BLOOD PRESSURE: 128 MMHG | HEART RATE: 82 BPM

## 2023-04-05 DIAGNOSIS — M54.50 ACUTE BILATERAL LOW BACK PAIN WITHOUT SCIATICA: ICD-10-CM

## 2023-04-05 DIAGNOSIS — N41.0 ACUTE PROSTATITIS: ICD-10-CM

## 2023-04-05 DIAGNOSIS — R30.0 DYSURIA: Primary | ICD-10-CM

## 2023-04-05 LAB
ALBUMIN SERPL BCP-MCNC: 4.3 G/DL (ref 3.5–5)
ALBUMIN/GLOB SERPL: 1.2 {RATIO}
ALP SERPL-CCNC: 50 U/L (ref 45–115)
ALT SERPL W P-5'-P-CCNC: 19 U/L (ref 16–61)
ANION GAP SERPL CALCULATED.3IONS-SCNC: 10 MMOL/L (ref 7–16)
AST SERPL W P-5'-P-CCNC: 11 U/L (ref 15–37)
BASOPHILS # BLD AUTO: 0.04 K/UL (ref 0–0.2)
BASOPHILS NFR BLD AUTO: 0.7 % (ref 0–1)
BILIRUB SERPL-MCNC: 0.5 MG/DL (ref ?–1.2)
BILIRUB SERPL-MCNC: NORMAL MG/DL
BLOOD URINE, POC: NEGATIVE
BUN SERPL-MCNC: 11 MG/DL (ref 7–18)
BUN/CREAT SERPL: 10 (ref 6–20)
CALCIUM SERPL-MCNC: 9.6 MG/DL (ref 8.5–10.1)
CHLORIDE SERPL-SCNC: 108 MMOL/L (ref 98–107)
CO2 SERPL-SCNC: 28 MMOL/L (ref 21–32)
COLOR, POC UA: YELLOW
CREAT SERPL-MCNC: 1.15 MG/DL (ref 0.7–1.3)
DIFFERENTIAL METHOD BLD: ABNORMAL
EGFR (NO RACE VARIABLE) (RUSH/TITUS): 93 ML/MIN/1.73M²
EOSINOPHIL # BLD AUTO: 0.05 K/UL (ref 0–0.5)
EOSINOPHIL NFR BLD AUTO: 0.9 % (ref 1–4)
ERYTHROCYTE [DISTWIDTH] IN BLOOD BY AUTOMATED COUNT: 12.8 % (ref 11.5–14.5)
GLOBULIN SER-MCNC: 3.5 G/DL (ref 2–4)
GLUCOSE SERPL-MCNC: 64 MG/DL (ref 74–106)
GLUCOSE UR QL STRIP: NEGATIVE
HCT VFR BLD AUTO: 42.2 % (ref 40–54)
HGB BLD-MCNC: 13.6 G/DL (ref 13.5–18)
IMM GRANULOCYTES # BLD AUTO: 0.01 K/UL (ref 0–0.04)
IMM GRANULOCYTES NFR BLD: 0.2 % (ref 0–0.4)
KETONES UR QL STRIP: NEGATIVE
LEUKOCYTE ESTERASE URINE, POC: NEGATIVE
LYMPHOCYTES # BLD AUTO: 2.4 K/UL (ref 1–4.8)
LYMPHOCYTES NFR BLD AUTO: 41.5 % (ref 27–41)
MCH RBC QN AUTO: 30.1 PG (ref 27–31)
MCHC RBC AUTO-ENTMCNC: 32.2 G/DL (ref 32–36)
MCV RBC AUTO: 93.4 FL (ref 80–96)
MONOCYTES # BLD AUTO: 0.63 K/UL (ref 0–0.8)
MONOCYTES NFR BLD AUTO: 10.9 % (ref 2–6)
MPC BLD CALC-MCNC: 11.9 FL (ref 9.4–12.4)
NEUTROPHILS # BLD AUTO: 2.65 K/UL (ref 1.8–7.7)
NEUTROPHILS NFR BLD AUTO: 45.8 % (ref 53–65)
NITRITE, POC UA: NEGATIVE
NRBC # BLD AUTO: 0 X10E3/UL
NRBC, AUTO (.00): 0 %
PH, POC UA: 7
PLATELET # BLD AUTO: 220 K/UL (ref 150–400)
POTASSIUM SERPL-SCNC: 4.4 MMOL/L (ref 3.5–5.1)
PROT SERPL-MCNC: 7.8 G/DL (ref 6.4–8.2)
PROTEIN, POC: NEGATIVE
RBC # BLD AUTO: 4.52 M/UL (ref 4.6–6.2)
SODIUM SERPL-SCNC: 142 MMOL/L (ref 136–145)
SPECIFIC GRAVITY, POC UA: 1.02
UROBILINOGEN, POC UA: 0.2
WBC # BLD AUTO: 5.78 K/UL (ref 4.5–11)

## 2023-04-05 PROCEDURE — 3074F SYST BP LT 130 MM HG: CPT | Mod: CPTII,,, | Performed by: FAMILY MEDICINE

## 2023-04-05 PROCEDURE — 80053 COMPREHENSIVE METABOLIC PANEL: ICD-10-PCS | Mod: ,,, | Performed by: CLINICAL MEDICAL LABORATORY

## 2023-04-05 PROCEDURE — 1159F PR MEDICATION LIST DOCUMENTED IN MEDICAL RECORD: ICD-10-PCS | Mod: CPTII,,, | Performed by: FAMILY MEDICINE

## 2023-04-05 PROCEDURE — 85025 COMPLETE CBC W/AUTO DIFF WBC: CPT | Mod: ,,, | Performed by: CLINICAL MEDICAL LABORATORY

## 2023-04-05 PROCEDURE — 3008F BODY MASS INDEX DOCD: CPT | Mod: CPTII,,, | Performed by: FAMILY MEDICINE

## 2023-04-05 PROCEDURE — 80053 COMPREHEN METABOLIC PANEL: CPT | Mod: ,,, | Performed by: CLINICAL MEDICAL LABORATORY

## 2023-04-05 PROCEDURE — 99213 PR OFFICE/OUTPT VISIT, EST, LEVL III, 20-29 MIN: ICD-10-PCS | Mod: ,,, | Performed by: FAMILY MEDICINE

## 2023-04-05 PROCEDURE — 3079F DIAST BP 80-89 MM HG: CPT | Mod: CPTII,,, | Performed by: FAMILY MEDICINE

## 2023-04-05 PROCEDURE — 3074F PR MOST RECENT SYSTOLIC BLOOD PRESSURE < 130 MM HG: ICD-10-PCS | Mod: CPTII,,, | Performed by: FAMILY MEDICINE

## 2023-04-05 PROCEDURE — 87086 CULTURE, URINE: ICD-10-PCS | Mod: ,,, | Performed by: CLINICAL MEDICAL LABORATORY

## 2023-04-05 PROCEDURE — 3008F PR BODY MASS INDEX (BMI) DOCUMENTED: ICD-10-PCS | Mod: CPTII,,, | Performed by: FAMILY MEDICINE

## 2023-04-05 PROCEDURE — 99213 OFFICE O/P EST LOW 20 MIN: CPT | Mod: ,,, | Performed by: FAMILY MEDICINE

## 2023-04-05 PROCEDURE — 87086 URINE CULTURE/COLONY COUNT: CPT | Mod: ,,, | Performed by: CLINICAL MEDICAL LABORATORY

## 2023-04-05 PROCEDURE — 1159F MED LIST DOCD IN RCRD: CPT | Mod: CPTII,,, | Performed by: FAMILY MEDICINE

## 2023-04-05 PROCEDURE — 85025 CBC WITH DIFFERENTIAL: ICD-10-PCS | Mod: ,,, | Performed by: CLINICAL MEDICAL LABORATORY

## 2023-04-05 PROCEDURE — 3079F PR MOST RECENT DIASTOLIC BLOOD PRESSURE 80-89 MM HG: ICD-10-PCS | Mod: CPTII,,, | Performed by: FAMILY MEDICINE

## 2023-04-05 PROCEDURE — 81003 URINALYSIS AUTO W/O SCOPE: CPT | Mod: RHCUB | Performed by: FAMILY MEDICINE

## 2023-04-05 RX ORDER — DOXYCYCLINE 100 MG/1
100 CAPSULE ORAL 2 TIMES DAILY
Qty: 30 CAPSULE | Refills: 1 | Status: SHIPPED | OUTPATIENT
Start: 2023-04-05

## 2023-04-05 NOTE — LETTER
April 5, 2023      [facility]  [address]  [place] [address]  Phone: [phone]  Fax: [phone]       Patient: Ba Ewing   YOB: 2000  Date of Visit: 04/05/2023    To Whom It May Concern:    Damaris Ewing  was at [facility] on 04/05/2023. The patient may return to work/school on 04/06/2023 with no restrictions. If you have any questions or concerns, or if I can be of further assistance, please do not hesitate to contact me.    Sincerely,    Shreya Hagen RN
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
## 2023-04-05 NOTE — ASSESSMENT & PLAN NOTE
She is his acute prostatitis the will treat with doxycycline 100 mg twice a day for 2 weeks and repeat that again in 2 weeks.  Follow-up if he is no better in 2 weeks or 1 month if he improves.  Back pain is likely related to his prostatitis.  Urinalysis was done today and it does not show any red cells or white cells.  Culture was obtained.  Will also obtain a CBC CMP.

## 2023-04-07 LAB — UA COMPLETE W REFLEX CULTURE PNL UR: NO GROWTH

## 2023-04-19 ENCOUNTER — OFFICE VISIT (OUTPATIENT)
Dept: FAMILY MEDICINE | Facility: CLINIC | Age: 23
End: 2023-04-19
Payer: MEDICAID

## 2023-04-19 VITALS
BODY MASS INDEX: 20.64 KG/M2 | TEMPERATURE: 98 F | HEIGHT: 77 IN | SYSTOLIC BLOOD PRESSURE: 138 MMHG | RESPIRATION RATE: 16 BRPM | DIASTOLIC BLOOD PRESSURE: 64 MMHG | OXYGEN SATURATION: 98 % | HEART RATE: 83 BPM | WEIGHT: 174.81 LBS

## 2023-04-19 DIAGNOSIS — N41.0 ACUTE PROSTATITIS: ICD-10-CM

## 2023-04-19 PROCEDURE — 3075F PR MOST RECENT SYSTOLIC BLOOD PRESS GE 130-139MM HG: ICD-10-PCS | Mod: CPTII,,, | Performed by: NURSE PRACTITIONER

## 2023-04-19 PROCEDURE — 3078F DIAST BP <80 MM HG: CPT | Mod: CPTII,,, | Performed by: NURSE PRACTITIONER

## 2023-04-19 PROCEDURE — 3008F PR BODY MASS INDEX (BMI) DOCUMENTED: ICD-10-PCS | Mod: CPTII,,, | Performed by: NURSE PRACTITIONER

## 2023-04-19 PROCEDURE — 3008F BODY MASS INDEX DOCD: CPT | Mod: CPTII,,, | Performed by: NURSE PRACTITIONER

## 2023-04-19 PROCEDURE — 99212 PR OFFICE/OUTPT VISIT, EST, LEVL II, 10-19 MIN: ICD-10-PCS | Mod: ,,, | Performed by: NURSE PRACTITIONER

## 2023-04-19 PROCEDURE — 1159F PR MEDICATION LIST DOCUMENTED IN MEDICAL RECORD: ICD-10-PCS | Mod: CPTII,,, | Performed by: NURSE PRACTITIONER

## 2023-04-19 PROCEDURE — 3075F SYST BP GE 130 - 139MM HG: CPT | Mod: CPTII,,, | Performed by: NURSE PRACTITIONER

## 2023-04-19 PROCEDURE — 1159F MED LIST DOCD IN RCRD: CPT | Mod: CPTII,,, | Performed by: NURSE PRACTITIONER

## 2023-04-19 PROCEDURE — 3078F PR MOST RECENT DIASTOLIC BLOOD PRESSURE < 80 MM HG: ICD-10-PCS | Mod: CPTII,,, | Performed by: NURSE PRACTITIONER

## 2023-04-19 PROCEDURE — 99212 OFFICE O/P EST SF 10 MIN: CPT | Mod: ,,, | Performed by: NURSE PRACTITIONER

## 2023-04-20 NOTE — PROGRESS NOTES
Ashley Melo NP   [facility]  [address]  [place]  [address]      PATIENT NAME: Ba Ewing  : 2000  DATE: 23  MRN: [id number]      Billing Provider: Ashley Melo NP  Level of Service: [place] OFFICE/OUTPT VISIT, EST, LEVL II, 10-19 MIN  Patient PCP Information       Provider PCP Type    Ashley Melo NP General            Reason for Visit / Chief Complaint: Follow-up (Follow-up for prostatitis. Still taking antibiotics and pt reports he is feeling a lot better. ) and Prostatitis         History of Present Illness / Problem Focused Workflow     Ba Ewing presents to the clinic with Follow-up (Follow-up for prostatitis. Still taking antibiotics and pt reports he is feeling a lot better. ) and Prostatitis     22 year old male presents to clinic for follow up on Prostatitis. He has 3 days of Doxycycline left. States all symptoms have resolved at this time.       Review of Systems     @Review of Systems   Constitutional:  Negative for activity change, appetite change, fatigue and fever.   HENT:  Negative for nasal congestion, ear pain, rhinorrhea, sinus pressure/congestion and sore throat.    Eyes:  Negative for pain, redness, visual disturbance and eye dryness.   Respiratory:  Negative for cough and shortness of breath.    Cardiovascular:  Negative for chest pain and leg swelling.   Gastrointestinal:  Negative for abdominal distention, abdominal pain, constipation and diarrhea.   Endocrine: Negative for cold intolerance, heat intolerance and polyuria.   Genitourinary:  Negative for bladder incontinence, dysuria, frequency and urgency.   Musculoskeletal:  Negative for arthralgias, gait problem and myalgias.   Integumentary:  Negative for color change, rash and wound.   Allergic/Immunologic: Negative for environmental allergies and food allergies.   Neurological:  Negative for dizziness, weakness, light-headedness and headaches.   Psychiatric/Behavioral:  Negative for  behavioral problems and sleep disturbance.      Medical / Social / Family History     Past Medical History:   Diagnosis Date    Acute prostatitis     Asthma     GERD (gastroesophageal reflux disease)     History of COVID-19 07/20/2020       Past Surgical History:   Procedure Laterality Date    DENTAL SURGERY      teeth extracted    MOUTH SURGERY  2020    cyst removed       Social History    reports that he quit smoking about 15 months ago. His smoking use included vaping with nicotine. He started smoking about 3 years ago. He has been exposed to tobacco smoke. He has never used smokeless tobacco. He reports current alcohol use. He reports current drug use. Frequency: 7.00 times per week. Drug: Marijuana.    Family History  's family history includes Asthma in his sister and sister; Hypertension in his father, maternal grandmother, and mother; No Known Problems in his brother, brother, daughter, maternal grandfather, paternal grandfather, and paternal grandmother.    Medications and Allergies     Medications  Outpatient Medications Marked as Taking for the 4/19/23 encounter (Office Visit) with Ashley Melo NP   Medication Sig Dispense Refill    doxycycline (VIBRAMYCIN) 100 MG Cap Take 1 capsule (100 mg total) by mouth 2 (two) times daily. 30 capsule 1    omeprazole (PRILOSEC) 20 MG capsule Take 20 mg by mouth once daily.         Allergies  Review of patient's allergies indicates:   Allergen Reactions    Amoxicillin-pot clavulanate Other (See Comments) and Diarrhea       Physical Examination     Vitals:    04/19/23 1440   BP: 138/64   Pulse: 83   Resp: 16   Temp: 98 °F (36.7 °C)     Physical Exam  Vitals and nursing note reviewed.   HENT:      Head: Normocephalic.      Right Ear: Tympanic membrane normal.      Left Ear: Tympanic membrane normal.      Nose: Nose normal.      Mouth/Throat:      Mouth: Mucous membranes are moist.      Pharynx: Oropharynx is clear. No posterior oropharyngeal erythema.   Eyes:       Conjunctiva/sclera: Conjunctivae normal.   Cardiovascular:      Rate and Rhythm: Normal rate and regular rhythm.      Pulses: Normal pulses.      Heart sounds: Normal heart sounds.   Pulmonary:      Effort: Pulmonary effort is normal.      Breath sounds: Normal breath sounds.   Abdominal:      General: Abdomen is flat. Bowel sounds are normal. There is no distension.      Palpations: Abdomen is soft.   Musculoskeletal:         General: No swelling or tenderness. Normal range of motion.      Cervical back: Normal range of motion.      Right lower leg: No edema.      Left lower leg: No edema.   Skin:     General: Skin is warm and dry.      Capillary Refill: Capillary refill takes less than 2 seconds.   Neurological:      Mental Status: He is alert. Mental status is at baseline.   Psychiatric:         Mood and Affect: Mood normal.         Behavior: Behavior normal.             Lab Results   Component Value Date    WBC 5.78 04/05/2023    HGB 13.6 04/05/2023    HCT 42.2 04/05/2023    MCV 93.4 04/05/2023     04/05/2023        CMP  Sodium   Date Value Ref Range Status   04/05/2023 142 136 - 145 mmol/L Final     Potassium   Date Value Ref Range Status   04/05/2023 4.4 3.5 - 5.1 mmol/L Final     Chloride   Date Value Ref Range Status   04/05/2023 108 (H) 98 - 107 mmol/L Final     CO2   Date Value Ref Range Status   04/05/2023 28 21 - 32 mmol/L Final     Glucose   Date Value Ref Range Status   04/05/2023 64 (L) 74 - 106 mg/dL Final     BUN   Date Value Ref Range Status   04/05/2023 11 7 - 18 mg/dL Final     Creatinine   Date Value Ref Range Status   04/05/2023 1.15 0.70 - 1.30 mg/dL Final     Calcium   Date Value Ref Range Status   04/05/2023 9.6 8.5 - 10.1 mg/dL Final     Total Protein   Date Value Ref Range Status   04/05/2023 7.8 6.4 - 8.2 g/dL Final     Albumin   Date Value Ref Range Status   04/05/2023 4.3 3.5 - 5.0 g/dL Final     Bilirubin, Total   Date Value Ref Range Status   04/05/2023 0.5 >0.0 - 1.2  mg/dL Final     Alk Phos   Date Value Ref Range Status   04/05/2023 50 45 - 115 U/L Final     AST   Date Value Ref Range Status   04/05/2023 11 (L) 15 - 37 U/L Final     ALT   Date Value Ref Range Status   04/05/2023 19 16 - 61 U/L Final     Anion Gap   Date Value Ref Range Status   04/05/2023 10 7 - 16 mmol/L Final     eGFR   Date Value Ref Range Status   04/05/2023 93 >=60 mL/min/1.73m² Final     Procedures   Assessment and Plan (including Health Maintenance)   :    Plan:           Problem List Items Addressed This Visit          Renal/    Acute prostatitis    Current Assessment & Plan     All symptoms have resolved. Finish Doxycycline. Contact clinic if symptoms return.              Health Maintenance Topics with due status: Not Due       Topic Last Completion Date    TETANUS VACCINE 10/05/2022       No future appointments.     Health Maintenance Due   Topic Date Due    Hepatitis C Screening  Never done    Lipid Panel  Never done        No follow-ups on file.     Signature:  Ashley Melo NP  [place] [id number] [place]  [address]    Date of encounter: 4/19/23

## 2023-05-05 ENCOUNTER — OFFICE VISIT (OUTPATIENT)
Dept: FAMILY MEDICINE | Facility: CLINIC | Age: 23
End: 2023-05-05
Payer: MEDICAID

## 2023-05-05 VITALS
WEIGHT: 169 LBS | BODY MASS INDEX: 19.96 KG/M2 | RESPIRATION RATE: 16 BRPM | TEMPERATURE: 98 F | HEIGHT: 77 IN | DIASTOLIC BLOOD PRESSURE: 60 MMHG | HEART RATE: 93 BPM | OXYGEN SATURATION: 98 % | SYSTOLIC BLOOD PRESSURE: 128 MMHG

## 2023-05-05 DIAGNOSIS — J32.9 SINUSITIS, UNSPECIFIED CHRONICITY, UNSPECIFIED LOCATION: Primary | ICD-10-CM

## 2023-05-05 DIAGNOSIS — R09.81 NASAL CONGESTION: ICD-10-CM

## 2023-05-05 PROBLEM — S41.112A LACERATIONS OF MULTIPLE SITES OF LEFT ARM: Status: RESOLVED | Noted: 2022-10-11 | Resolved: 2023-05-05

## 2023-05-05 PROBLEM — N41.0 ACUTE PROSTATITIS: Status: RESOLVED | Noted: 2023-04-05 | Resolved: 2023-05-05

## 2023-05-05 PROBLEM — J06.9 UPPER RESPIRATORY TRACT INFECTION: Status: RESOLVED | Noted: 2021-06-25 | Resolved: 2023-05-05

## 2023-05-05 PROBLEM — M62.838 CERVICAL PARASPINAL MUSCLE SPASM: Status: RESOLVED | Noted: 2022-11-14 | Resolved: 2023-05-05

## 2023-05-05 PROBLEM — S16.1XXA CERVICAL STRAIN: Status: RESOLVED | Noted: 2022-10-19 | Resolved: 2023-05-05

## 2023-05-05 PROCEDURE — 99213 OFFICE O/P EST LOW 20 MIN: CPT | Mod: ,,,

## 2023-05-05 PROCEDURE — 3008F PR BODY MASS INDEX (BMI) DOCUMENTED: ICD-10-PCS | Mod: CPTII,,,

## 2023-05-05 PROCEDURE — 1159F MED LIST DOCD IN RCRD: CPT | Mod: CPTII,,,

## 2023-05-05 PROCEDURE — 3078F PR MOST RECENT DIASTOLIC BLOOD PRESSURE < 80 MM HG: ICD-10-PCS | Mod: CPTII,,,

## 2023-05-05 PROCEDURE — 1159F PR MEDICATION LIST DOCUMENTED IN MEDICAL RECORD: ICD-10-PCS | Mod: CPTII,,,

## 2023-05-05 PROCEDURE — 3008F BODY MASS INDEX DOCD: CPT | Mod: CPTII,,,

## 2023-05-05 PROCEDURE — 3074F SYST BP LT 130 MM HG: CPT | Mod: CPTII,,,

## 2023-05-05 PROCEDURE — 1160F PR REVIEW ALL MEDS BY PRESCRIBER/CLIN PHARMACIST DOCUMENTED: ICD-10-PCS | Mod: CPTII,,,

## 2023-05-05 PROCEDURE — 99213 PR OFFICE/OUTPT VISIT, EST, LEVL III, 20-29 MIN: ICD-10-PCS | Mod: ,,,

## 2023-05-05 PROCEDURE — 1160F RVW MEDS BY RX/DR IN RCRD: CPT | Mod: CPTII,,,

## 2023-05-05 PROCEDURE — 3074F PR MOST RECENT SYSTOLIC BLOOD PRESSURE < 130 MM HG: ICD-10-PCS | Mod: CPTII,,,

## 2023-05-05 PROCEDURE — 3078F DIAST BP <80 MM HG: CPT | Mod: CPTII,,,

## 2023-05-05 RX ORDER — AZITHROMYCIN 250 MG/1
TABLET, FILM COATED ORAL
Qty: 6 TABLET | Refills: 0 | Status: SHIPPED | OUTPATIENT
Start: 2023-05-05 | End: 2023-05-10

## 2023-05-05 NOTE — LETTER
May 5, 2023      [facility]  [address]  [place] [address]  Phone: [phone]  Fax: [phone]       Patient: Ba Ewing   YOB: 2000  Date of Visit: 05/05/2023    To Whom It May Concern:    Damaris Ewing  was at [facility] on 05/05/2023. The patient may return to work/school on 05/08/2023 with no restrictions. If you have any questions or concerns, or if I can be of further assistance, please do not hesitate to contact me.    Sincerely,    Shreya Hagen RN

## 2023-05-05 NOTE — PROGRESS NOTES
Subjective     Patient ID: Ba Ewing is a 22 y.o. male.    Chief Complaint: Nasal Congestion (Started at the beginning of this week. Needing to be tested for the flu for work. Reports a lot of co-workers have had it. ), Cough (Started the beginning of this week. Productive cough. Yellow mucus. ), and Headache (Had a headache Monday, Wednesday, and yesterday. )    23 y/o male presents to clinic with complaints of cough, nasal congestion, sinus drainage, HA that started Monday. States has coworker that tested positive for flu this week. He denies any chest pain, SOB, fever, chills, sore throat, GI symptoms. Pt states he has not taken anything to relieve the symptoms this week.     Cough  Associated symptoms include headaches. Pertinent negatives include no chest pain, chills, fever, sore throat or shortness of breath.   Headache   Associated symptoms include coughing and sinus pressure. Pertinent negatives include no abdominal pain, dizziness, fever, nausea, sore throat or weakness.   Review of Systems   Constitutional:  Negative for chills and fever.   HENT:  Positive for nasal congestion and sinus pressure/congestion. Negative for sore throat.    Respiratory:  Positive for cough. Negative for chest tightness and shortness of breath.    Cardiovascular:  Negative for chest pain and palpitations.   Gastrointestinal:  Negative for abdominal pain, change in bowel habit, nausea, reflux and change in bowel habit.   Neurological:  Positive for headaches. Negative for dizziness and weakness.   Psychiatric/Behavioral:  Negative for suicidal ideas.         Objective     Physical Exam  Constitutional:       Appearance: Normal appearance.   HENT:      Head: Normocephalic.      Right Ear: Tympanic membrane normal.      Left Ear: Tympanic membrane normal.      Mouth/Throat:      Pharynx: No posterior oropharyngeal erythema.      Comments: Tender bilateral maxillary sinuses  Eyes:      Pupils: Pupils are equal, round, and  reactive to light.   Cardiovascular:      Rate and Rhythm: Normal rate and regular rhythm.      Heart sounds: Normal heart sounds.   Pulmonary:      Effort: Pulmonary effort is normal. No respiratory distress.      Breath sounds: Normal breath sounds. No decreased breath sounds.      Comments: cough  Musculoskeletal:         General: Normal range of motion.      Cervical back: Normal range of motion.   Skin:     General: Skin is warm and dry.   Neurological:      Mental Status: He is alert and oriented to person, place, and time.   Psychiatric:         Mood and Affect: Mood normal.         Behavior: Behavior normal.          Assessment and Plan     Problem List Items Addressed This Visit          ENT    Sinusitis - Primary     Covid/Flu negative in clinic today. Pt refuses steroids. Azithromycin RX today with medication instructions given to pt with understanding voiced. Increase fluid intake, rest. OTC antihistamines, decongestants, Ibuprofen, Tylenol as needed. RTC with worsening,new or persistent symptoms with understanding voiced.            Nasal congestion     Covid/Flu negative in clinic today. Take medication as prescribed.            Relevant Orders    POCT SARS-COV2 (COVID) with Flu Antigen

## 2023-05-05 NOTE — ASSESSMENT & PLAN NOTE
Covid/Flu negative in clinic today. Pt refuses steroids. Azithromycin RX today with medication instructions given to pt with understanding voiced. Increase fluid intake, rest. OTC antihistamines, decongestants, Ibuprofen, Tylenol as needed. RTC with worsening,new or persistent symptoms with understanding voiced.

## 2023-05-05 NOTE — PROGRESS NOTES
Subjective     Patient ID: Ba Ewing is a 22 y.o. male.    Chief Complaint: Nasal Congestion (Started at the beginning of this week. Needing to be tested for the flu for work. Reports a lot of co-workers have had it. ), Cough (Started the beginning of this week. Productive cough. Yellow mucus. ), and Headache (Had a headache Monday, Wednesday, and yesterday. )    HPI  Review of Systems       Objective     Physical Exam       Assessment and Plan     Problem List Items Addressed This Visit          ENT    Nasal congestion - Primary    Relevant Orders    POCT SARS-COV2 (COVID) with Flu Antigen     Other Visit Diagnoses       Cough, unspecified type        Relevant Orders    POCT SARS-COV2 (COVID) with Flu Antigen

## 2023-05-25 PROBLEM — R30.0 DYSURIA: Status: ACTIVE | Noted: 2023-05-25

## 2023-05-25 NOTE — PROGRESS NOTES
Ashley Melo NP   [facility]  [address]  [place]  [address]      PATIENT NAME: Ba Ewing  : 2000  DATE: 3/28/23  MRN: [id number]      Billing Provider: Ashley Melo NP  Level of Service: [facility] OFFICE/OUTPT VISIT, EST, LEVL III, 20-29 MIN  Patient PCP Information       Provider PCP Type    Ashley Melo NP General            Reason for Visit / Chief Complaint: Hematuria (On and off since last week. ), Low-back Pain (Has been going on for about 2 weeks. ), Flank Pain (Has been going on for about about 2 weeks. ), and Dysuria         History of Present Illness / Problem Focused Workflow     Ba Ewing presents to the clinic with Hematuria (On and off since last week. ), Low-back Pain (Has been going on for about 2 weeks. ), Flank Pain (Has been going on for about about 2 weeks. ), and Dysuria     22 year old male presents to clinic with complaints of hematuria, low back and flank pain and burning with urination that has been ongoing for about 2 weeks now. He reprots he has had STD before and this feels similar.Denies any penile discharge or lesions.       Review of Systems     @Review of Systems   Constitutional:  Negative for activity change, appetite change, fatigue and fever.   HENT:  Negative for nasal congestion, ear pain, rhinorrhea, sinus pressure/congestion and sore throat.    Eyes:  Negative for pain, redness, visual disturbance and eye dryness.   Respiratory:  Negative for cough and shortness of breath.    Cardiovascular:  Negative for chest pain and leg swelling.   Gastrointestinal:  Negative for abdominal distention, abdominal pain, constipation and diarrhea.   Endocrine: Negative for cold intolerance, heat intolerance and polyuria.   Genitourinary:  Positive for dysuria and hematuria. Negative for bladder incontinence, frequency and urgency.   Musculoskeletal:  Positive for back pain. Negative for arthralgias, gait problem and myalgias.   Integumentary:   Negative for color change, rash and wound.   Allergic/Immunologic: Negative for environmental allergies and food allergies.   Neurological:  Negative for dizziness, weakness, light-headedness and headaches.   Psychiatric/Behavioral:  Negative for behavioral problems and sleep disturbance.      Medical / Social / Family History     Past Medical History:   Diagnosis Date    Acute prostatitis     Asthma     GERD (gastroesophageal reflux disease)     History of COVID-19 07/20/2020       Past Surgical History:   Procedure Laterality Date    DENTAL SURGERY      teeth extracted    MOUTH SURGERY  2020    cyst removed       Social History    reports that he quit smoking about 16 months ago. His smoking use included vaping with nicotine. He started smoking about 3 years ago. He has been exposed to tobacco smoke. He has never used smokeless tobacco. He reports current alcohol use. He reports current drug use. Frequency: 7.00 times per week. Drug: Marijuana.    Family History  's family history includes Asthma in his sister and sister; Hypertension in his father, maternal grandmother, and mother; No Known Problems in his brother, brother, daughter, maternal grandfather, paternal grandfather, and paternal grandmother.    Medications and Allergies     Medications  Outpatient Medications Marked as Taking for the 3/28/23 encounter (Office Visit) with Ashley Melo NP   Medication Sig Dispense Refill    omeprazole (PRILOSEC) 20 MG capsule Take 20 mg by mouth once daily.         Allergies  Review of patient's allergies indicates:   Allergen Reactions    Amoxicillin-pot clavulanate Other (See Comments) and Diarrhea       Physical Examination     Vitals:    03/28/23 0917   BP: 138/88   Pulse: 75   Resp: 16   Temp: 97.7 °F (36.5 °C)     Physical Exam  Vitals and nursing note reviewed.   HENT:      Head: Normocephalic.      Right Ear: Tympanic membrane normal.      Left Ear: Tympanic membrane normal.      Nose: Nose normal.       Mouth/Throat:      Mouth: Mucous membranes are moist.      Pharynx: Oropharynx is clear. No posterior oropharyngeal erythema.   Eyes:      Conjunctiva/sclera: Conjunctivae normal.   Cardiovascular:      Rate and Rhythm: Normal rate and regular rhythm.      Pulses: Normal pulses.      Heart sounds: Normal heart sounds.   Pulmonary:      Effort: Pulmonary effort is normal.      Breath sounds: Normal breath sounds.   Abdominal:      General: Abdomen is flat. Bowel sounds are normal. There is no distension.      Palpations: Abdomen is soft.      Tenderness: There is no right CVA tenderness or left CVA tenderness.   Musculoskeletal:         General: No swelling. Normal range of motion.      Cervical back: Normal range of motion.      Lumbar back: Tenderness and bony tenderness present.      Right lower leg: No edema.      Left lower leg: No edema.   Skin:     General: Skin is warm and dry.      Capillary Refill: Capillary refill takes less than 2 seconds.   Neurological:      Mental Status: He is alert. Mental status is at baseline.   Psychiatric:         Mood and Affect: Mood normal.         Behavior: Behavior normal.             Lab Results   Component Value Date    WBC 5.78 04/05/2023    HGB 13.6 04/05/2023    HCT 42.2 04/05/2023    MCV 93.4 04/05/2023     04/05/2023        CMP  Sodium   Date Value Ref Range Status   04/05/2023 142 136 - 145 mmol/L Final     Potassium   Date Value Ref Range Status   04/05/2023 4.4 3.5 - 5.1 mmol/L Final     Chloride   Date Value Ref Range Status   04/05/2023 108 (H) 98 - 107 mmol/L Final     CO2   Date Value Ref Range Status   04/05/2023 28 21 - 32 mmol/L Final     Glucose   Date Value Ref Range Status   04/05/2023 64 (L) 74 - 106 mg/dL Final     BUN   Date Value Ref Range Status   04/05/2023 11 7 - 18 mg/dL Final     Creatinine   Date Value Ref Range Status   04/05/2023 1.15 0.70 - 1.30 mg/dL Final     Calcium   Date Value Ref Range Status   04/05/2023 9.6 8.5 - 10.1 mg/dL  Final     Total Protein   Date Value Ref Range Status   04/05/2023 7.8 6.4 - 8.2 g/dL Final     Albumin   Date Value Ref Range Status   04/05/2023 4.3 3.5 - 5.0 g/dL Final     Bilirubin, Total   Date Value Ref Range Status   04/05/2023 0.5 >0.0 - 1.2 mg/dL Final     Alk Phos   Date Value Ref Range Status   04/05/2023 50 45 - 115 U/L Final     AST   Date Value Ref Range Status   04/05/2023 11 (L) 15 - 37 U/L Final     ALT   Date Value Ref Range Status   04/05/2023 19 16 - 61 U/L Final     Anion Gap   Date Value Ref Range Status   04/05/2023 10 7 - 16 mmol/L Final     eGFR   Date Value Ref Range Status   04/05/2023 93 >=60 mL/min/1.73m² Final     Procedures   Assessment and Plan (including Health Maintenance)   :    Plan:           Problem List Items Addressed This Visit          Renal/    Dysuria    Relevant Orders    Chlamydia/GC, PCR (Completed)     Other Visit Diagnoses       Encounter for screening for other viral diseases    -  Primary    Hematuria, unspecified type        Relevant Orders    POCT URINALYSIS W/O SCOPE (Completed)    Flank pain        Relevant Orders    POCT URINALYSIS W/O SCOPE (Completed)            Health Maintenance Topics with due status: Not Due       Topic Last Completion Date    TETANUS VACCINE 10/05/2022       No future appointments.     Health Maintenance Due   Topic Date Due    Hepatitis C Screening  Never done    Lipid Panel  Never done        No follow-ups on file.     Signature:  Ashley Melo NP  [address] [place]  [address]    Date of encounter: 3/28/23

## 2024-04-28 ENCOUNTER — HOSPITAL ENCOUNTER (EMERGENCY)
Facility: HOSPITAL | Age: 24
Discharge: HOME | End: 2024-04-28

## 2024-04-28 VITALS
TEMPERATURE: 100 F | BODY MASS INDEX: 21.25 KG/M2 | SYSTOLIC BLOOD PRESSURE: 117 MMHG | WEIGHT: 180 LBS | RESPIRATION RATE: 16 BRPM | OXYGEN SATURATION: 97 % | HEIGHT: 77 IN | HEART RATE: 106 BPM | DIASTOLIC BLOOD PRESSURE: 67 MMHG

## 2024-04-28 DIAGNOSIS — J02.9 PHARYNGITIS, UNSPECIFIED ETIOLOGY: Primary | ICD-10-CM

## 2024-04-28 LAB
GROUP A STREP MOLECULAR (OHS): NEGATIVE
INFLUENZA A MOLECULAR (OHS): NEGATIVE
INFLUENZA B MOLECULAR (OHS): NEGATIVE
SARS-COV-2 RDRP RESP QL NAA+PROBE: NEGATIVE

## 2024-04-28 PROCEDURE — 63600175 PHARM REV CODE 636 W HCPCS: Performed by: NURSE PRACTITIONER

## 2024-04-28 PROCEDURE — 87502 INFLUENZA DNA AMP PROBE: CPT | Performed by: NURSE PRACTITIONER

## 2024-04-28 PROCEDURE — 96372 THER/PROPH/DIAG INJ SC/IM: CPT | Performed by: NURSE PRACTITIONER

## 2024-04-28 PROCEDURE — 87651 STREP A DNA AMP PROBE: CPT | Performed by: NURSE PRACTITIONER

## 2024-04-28 PROCEDURE — 87635 SARS-COV-2 COVID-19 AMP PRB: CPT | Performed by: NURSE PRACTITIONER

## 2024-04-28 PROCEDURE — 25000003 PHARM REV CODE 250: Performed by: NURSE PRACTITIONER

## 2024-04-28 PROCEDURE — 99284 EMERGENCY DEPT VISIT MOD MDM: CPT | Mod: 25

## 2024-04-28 PROCEDURE — 99284 EMERGENCY DEPT VISIT MOD MDM: CPT | Mod: ,,, | Performed by: NURSE PRACTITIONER

## 2024-04-28 RX ORDER — AZITHROMYCIN 250 MG/1
TABLET, FILM COATED ORAL
Qty: 6 TABLET | Refills: 0 | Status: SHIPPED | OUTPATIENT
Start: 2024-04-28 | End: 2024-05-03

## 2024-04-28 RX ORDER — ACETAMINOPHEN 500 MG
1000 TABLET ORAL
Status: COMPLETED | OUTPATIENT
Start: 2024-04-28 | End: 2024-04-28

## 2024-04-28 RX ORDER — CEFTRIAXONE 1 G/1
1 INJECTION, POWDER, FOR SOLUTION INTRAMUSCULAR; INTRAVENOUS
Status: COMPLETED | OUTPATIENT
Start: 2024-04-28 | End: 2024-04-28

## 2024-04-28 RX ADMIN — ACETAMINOPHEN 1000 MG: 500 TABLET ORAL at 08:04

## 2024-04-28 RX ADMIN — CEFTRIAXONE SODIUM 1 G: 1 INJECTION, POWDER, FOR SOLUTION INTRAMUSCULAR; INTRAVENOUS at 08:04

## 2024-04-28 NOTE — Clinical Note
"Ba Centeno" Gildardo was seen and treated in our emergency department on 4/28/2024.  He may return to work on 05/02/2024.       If you have any questions or concerns, please don't hesitate to call.      STAR Otto RN RN    "

## 2024-04-28 NOTE — ED TRIAGE NOTES
Patient presents to the ED with reports of having a sore throat since Thursday. He also reports having a headache and running a fever of around 100. Patient states he has been taking ibuprofen for the fever and the last time he took that was on yesterday. Patient reports vomiting one time last night as well. He has been taking amoxicillin 500 mg BID that someone gave him that they had left over from a previous prescription.

## 2024-04-28 NOTE — ED PROVIDER NOTES
Encounter Date: 2024       History     Chief Complaint   Patient presents with    Sore Throat     Patient is a 23-year-old male who presents to the ED today for evaluation of sore throat that started yesterday.  He states he gets strep throat quite frequently 3 to 4 times a year.  He started take some Amoxil yesterday that was not prescribed to him but he thought it might help.  He has also had a low-grade fever of which he is taken ibuprofen patient does not have difficulty eating he has not having any trouble swallowing        Review of patient's allergies indicates:   Allergen Reactions    Amoxicillin-pot clavulanate Other (See Comments) and Diarrhea     Past Medical History:   Diagnosis Date    Acute prostatitis     Asthma     GERD (gastroesophageal reflux disease)     History of COVID-19 2020     Past Surgical History:   Procedure Laterality Date    DENTAL SURGERY      teeth extracted    MOUTH SURGERY      cyst removed     Family History   Problem Relation Name Age of Onset    Hypertension Mother      Hypertension Father      Asthma Sister      Asthma Sister      No Known Problems Brother      No Known Problems Brother      No Known Problems Daughter      Hypertension Maternal Grandmother      No Known Problems Maternal Grandfather      No Known Problems Paternal Grandmother      No Known Problems Paternal Grandfather       Social History     Tobacco Use    Smoking status: Former     Types: Vaping with nicotine     Start date:      Quit date:      Years since quittin.3     Passive exposure: Current    Smokeless tobacco: Never   Substance Use Topics    Alcohol use: Yes     Comment: 2 times per year    Drug use: Yes     Frequency: 7.0 times per week     Types: Marijuana     Review of Systems   Constitutional:  Negative for fever.   HENT:  Positive for sore throat.    Respiratory:  Negative for shortness of breath.    Cardiovascular:  Negative for chest pain.   Gastrointestinal:   Negative for nausea.   Genitourinary:  Negative for dysuria.   Musculoskeletal:  Negative for back pain.   Skin:  Negative for rash.   Neurological:  Negative for weakness.   Hematological:  Does not bruise/bleed easily.       Physical Exam     Initial Vitals [04/28/24 0804]   BP Pulse Resp Temp SpO2   117/67 106 16 100.1 °F (37.8 °C) 97 %      MAP       --         Physical Exam    Nursing note and vitals reviewed.  Constitutional: He appears well-developed and well-nourished.   HENT:   Mouth/Throat: Oropharyngeal exudate present.   Eyes: Pupils are equal, round, and reactive to light.   Neck: Neck supple. No tracheal deviation present.   Cardiovascular:  Normal rate.           Pulmonary/Chest: Breath sounds normal.   Abdominal: Abdomen is soft.   Musculoskeletal:         General: Normal range of motion.      Cervical back: Neck supple.     Skin: Skin is warm and dry.         Medical Screening Exam   See Full Note    ED Course   Procedures  Labs Reviewed   INFLUENZA A & B BY MOLECULAR - Normal   STREP A BY MOLECULAR METHOD - Normal   SARS-COV-2 RNA AMPLIFICATION, QUAL - Normal    Narrative:     Negative SARS-CoV results should not be used as the sole basis for treatment or patient management decisions; negative results should be considered in the context of a patient's recent exposures, history and the presene of clinical signs and symptoms consistent with COVID-19.  Negative results should be treated as presumptive and confirmed by molecular assay, if necessary for patient management.          Imaging Results    None          Medications   acetaminophen tablet 1,000 mg (has no administration in time range)   cefTRIAXone injection 1 g (has no administration in time range)     Medical Decision Making  Amount and/or Complexity of Data Reviewed  Labs: ordered.    Risk  OTC drugs.  Prescription drug management.                                      Clinical Impression:   Final diagnoses:  [J02.9] Pharyngitis, unspecified  etiology (Primary)        ED Disposition Condition    Discharge Stable          ED Prescriptions       Medication Sig Dispense Start Date End Date Auth. Provider    azithromycin (Z-NICKY) 250 MG tablet Take 2 tablets by mouth on day 1; Take 1 tablet by mouth on days 2-5 6 tablet 4/28/2024 5/3/2024 Danna Slaughter FNP          Follow-up Information       Follow up With Specialties Details Why Contact Info    Ashley Melo NP Family Medicine In 2 days  [address] [address] [place] [address] [phone]               Danna Slaughter FNP  04/28/24 0861

## 2024-04-30 ENCOUNTER — TELEPHONE (OUTPATIENT)
Dept: EMERGENCY MEDICINE | Facility: HOSPITAL | Age: 24
End: 2024-04-30

## 2024-09-16 ENCOUNTER — OFFICE VISIT (OUTPATIENT)
Dept: FAMILY MEDICINE | Facility: CLINIC | Age: 24
End: 2024-09-16
Payer: COMMERCIAL

## 2024-09-16 VITALS
SYSTOLIC BLOOD PRESSURE: 123 MMHG | OXYGEN SATURATION: 100 % | RESPIRATION RATE: 18 BRPM | HEIGHT: 77 IN | DIASTOLIC BLOOD PRESSURE: 69 MMHG | TEMPERATURE: 98 F | BODY MASS INDEX: 22.04 KG/M2 | HEART RATE: 69 BPM | WEIGHT: 186.63 LBS

## 2024-09-16 DIAGNOSIS — R10.9 ABDOMINAL PAIN, UNSPECIFIED ABDOMINAL LOCATION: ICD-10-CM

## 2024-09-16 DIAGNOSIS — J34.9 SINUS PROBLEM: ICD-10-CM

## 2024-09-16 DIAGNOSIS — J32.9 BACTERIAL SINUSITIS: ICD-10-CM

## 2024-09-16 DIAGNOSIS — R50.9 FEVER, UNSPECIFIED FEVER CAUSE: ICD-10-CM

## 2024-09-16 DIAGNOSIS — R51.9 ACUTE INTRACTABLE HEADACHE, UNSPECIFIED HEADACHE TYPE: Primary | ICD-10-CM

## 2024-09-16 DIAGNOSIS — B96.89 BACTERIAL SINUSITIS: ICD-10-CM

## 2024-09-16 DIAGNOSIS — R19.7 DIARRHEA, UNSPECIFIED TYPE: ICD-10-CM

## 2024-09-16 DIAGNOSIS — R05.1 ACUTE COUGH: ICD-10-CM

## 2024-09-16 LAB
CTP QC/QA: YES
CTP QC/QA: YES
POC MOLECULAR INFLUENZA A AGN: NEGATIVE
POC MOLECULAR INFLUENZA B AGN: NEGATIVE
SARS-COV-2 RDRP RESP QL NAA+PROBE: NEGATIVE

## 2024-09-16 PROCEDURE — 3074F SYST BP LT 130 MM HG: CPT | Mod: CPTII,,, | Performed by: FAMILY MEDICINE

## 2024-09-16 PROCEDURE — 3078F DIAST BP <80 MM HG: CPT | Mod: CPTII,,, | Performed by: FAMILY MEDICINE

## 2024-09-16 PROCEDURE — 1159F MED LIST DOCD IN RCRD: CPT | Mod: CPTII,,, | Performed by: FAMILY MEDICINE

## 2024-09-16 PROCEDURE — 87502 INFLUENZA DNA AMP PROBE: CPT | Mod: QW,,, | Performed by: FAMILY MEDICINE

## 2024-09-16 PROCEDURE — 1160F RVW MEDS BY RX/DR IN RCRD: CPT | Mod: CPTII,,, | Performed by: FAMILY MEDICINE

## 2024-09-16 PROCEDURE — 96372 THER/PROPH/DIAG INJ SC/IM: CPT | Mod: ,,, | Performed by: FAMILY MEDICINE

## 2024-09-16 PROCEDURE — 3008F BODY MASS INDEX DOCD: CPT | Mod: CPTII,,, | Performed by: FAMILY MEDICINE

## 2024-09-16 PROCEDURE — 87635 SARS-COV-2 COVID-19 AMP PRB: CPT | Mod: QW,,, | Performed by: FAMILY MEDICINE

## 2024-09-16 PROCEDURE — 99213 OFFICE O/P EST LOW 20 MIN: CPT | Mod: 25,,, | Performed by: FAMILY MEDICINE

## 2024-09-16 RX ORDER — FLUTICASONE PROPIONATE 50 MCG
1 SPRAY, SUSPENSION (ML) NASAL DAILY
Qty: 32 ML | Refills: 0 | Status: SHIPPED | OUTPATIENT
Start: 2024-09-16

## 2024-09-16 RX ORDER — AZITHROMYCIN 250 MG/1
TABLET, FILM COATED ORAL
Qty: 6 TABLET | Refills: 0 | Status: SHIPPED | OUTPATIENT
Start: 2024-09-16 | End: 2024-09-21

## 2024-09-16 RX ORDER — IPRATROPIUM BROMIDE 21 UG/1
2 SPRAY, METERED NASAL 3 TIMES DAILY
Qty: 30 ML | Refills: 0 | Status: SHIPPED | OUTPATIENT
Start: 2024-09-16

## 2024-09-16 RX ORDER — METHYLPREDNISOLONE ACETATE 40 MG/ML
40 INJECTION, SUSPENSION INTRA-ARTICULAR; INTRALESIONAL; INTRAMUSCULAR; SOFT TISSUE ONCE
Status: COMPLETED | OUTPATIENT
Start: 2024-09-16 | End: 2024-09-16

## 2024-09-16 RX ADMIN — METHYLPREDNISOLONE ACETATE 40 MG: 40 INJECTION, SUSPENSION INTRA-ARTICULAR; INTRALESIONAL; INTRAMUSCULAR; SOFT TISSUE at 03:09

## 2024-09-16 NOTE — PROGRESS NOTES
"   Doron Urena DO   [place] [address]  [place]  [address]      PATIENT NAME: Ba Ewing  : 2000  DATE: 24  MRN: [id number]      Billing Provider: Doron Urena DO  Level of Service:   Patient PCP Information       Provider PCP Type    Ashley Melo NP General            Reason for Visit / Chief Complaint: Headache (Patient is Ba Ewing ( [place]) a  25 y/o male that reports he has had a headache x 3-4 days. ), Sinus Problem (Patient states he has had sinus problems x 2 weeks. Patient states at times his nose will be runny and at times congested. Patient also states he has been sneezing a lot.), Abdominal Pain (Patient states he has had a stomach ache x 2 weeks. ), Cough (Patient reports he has had a productive cough x 1 week. He states he coughs up yellow/clear mucus.), Diarrhea (Patient states he has been having the diarrhea x 2 weeks. ), Fever (Patient states he has had chills but has never checked his temperature. Patient reports they have been off and on x 2 weeks.), and Health Maintenance (Hepatitis C Screening- Patient defers he is sick today/Lipid Panel - Patient defers he is sick today./Influenza Vaccine(1) due on 2024/COVID-19 Vaccine( -  season)- Patient defers he is sick today./)         History of Present Illness / Problem Focused Workflow     Headache   Associated symptoms include abdominal pain, coughing and a fever. Pertinent negatives include no dizziness, nausea, numbness, sore throat or vomiting.   Sinus Problem  Associated symptoms include coughing and headaches. Pertinent negatives include no chills, diaphoresis, shortness of breath or sore throat.   Abdominal Pain  Associated symptoms include diarrhea, a fever and headaches. Pertinent negatives include no arthralgias, dysuria, hematuria, nausea or vomiting.   Cough  Associated symptoms include a fever and headaches. Pertinent negatives include no chest pain, chills, rash, sore " throat or shortness of breath.   Diarrhea   Associated symptoms include abdominal pain, coughing, a fever and headaches. Pertinent negatives include no arthralgias, chills or vomiting.   Fever   Associated symptoms include abdominal pain, coughing, diarrhea and headaches. Pertinent negatives include no chest pain, nausea, rash, sore throat or vomiting.       Review of Systems     @Review of Systems   Constitutional:  Positive for fever. Negative for chills and diaphoresis.   HENT:  Negative for sore throat.    Eyes:  Negative for visual disturbance.   Respiratory:  Positive for cough. Negative for shortness of breath.    Cardiovascular:  Negative for chest pain and palpitations.   Gastrointestinal:  Positive for abdominal pain and diarrhea. Negative for nausea and vomiting.   Genitourinary:  Negative for dysuria and hematuria.   Musculoskeletal:  Negative for arthralgias and leg pain.   Integumentary:  Negative for rash.   Neurological:  Positive for headaches. Negative for dizziness, light-headedness and numbness.       Medical / Social / Family History     Past Medical History:   Diagnosis Date    Acute prostatitis     Asthma     GERD (gastroesophageal reflux disease)     History of COVID-19 07/20/2020       Past Surgical History:   Procedure Laterality Date    DENTAL SURGERY      teeth extracted    MOUTH SURGERY  2020    cyst removed       Medications and Allergies     Medications  Outpatient Medications Marked as Taking for the 9/16/24 encounter (Office Visit) with Doron Urena DO   Medication Sig Dispense Refill    omeprazole (PRILOSEC) 20 MG capsule Take 20 mg by mouth once daily.       Current Facility-Administered Medications for the 9/16/24 encounter (Office Visit) with Doron Urena DO   Medication Dose Route Frequency Provider Last Rate Last Admin    [COMPLETED] methylPREDNISolone acetate injection 40 mg  40 mg Intramuscular Once Doron Urena DO   40 mg at 09/16/24 1526        Allergies  Review of patient's allergies indicates:   Allergen Reactions    Amoxicillin-pot clavulanate Other (See Comments) and Diarrhea       Physical Examination     Vitals:    09/16/24 1442   BP: 123/69   Pulse: 69   Resp: 18   Temp: 97.9 °F (36.6 °C)     Physical Exam  Vitals and nursing note reviewed.   Constitutional:       General: He is not in acute distress.     Appearance: He is not ill-appearing, toxic-appearing or diaphoretic.   HENT:      Head: Normocephalic and atraumatic.      Right Ear: External ear normal.      Left Ear: External ear normal.      Nose: Nose normal.      Mouth/Throat:      Pharynx: No oropharyngeal exudate or posterior oropharyngeal erythema.   Eyes:      General: No scleral icterus.        Right eye: No discharge.         Left eye: No discharge.      Extraocular Movements: Extraocular movements intact.   Neck:      Vascular: No carotid bruit.   Cardiovascular:      Rate and Rhythm: Normal rate and regular rhythm.      Pulses: Normal pulses.      Heart sounds: Normal heart sounds. No murmur heard.     No friction rub. No gallop.   Pulmonary:      Effort: Pulmonary effort is normal. No respiratory distress.      Breath sounds: No stridor. No wheezing, rhonchi or rales.   Chest:      Chest wall: No tenderness.   Abdominal:      Palpations: Abdomen is soft.      Tenderness: There is no abdominal tenderness. There is no guarding.   Musculoskeletal:         General: No swelling.      Right lower leg: No edema.      Left lower leg: No edema.   Skin:     General: Skin is warm and dry.   Neurological:      Mental Status: He is alert and oriented to person, place, and time.               Lab Results   Component Value Date    WBC 5.78 04/05/2023    HGB 13.6 04/05/2023    HCT 42.2 04/05/2023    MCV 93.4 04/05/2023     04/05/2023        CMP  Sodium   Date Value Ref Range Status   04/05/2023 142 136 - 145 mmol/L Final     Potassium   Date Value Ref Range Status   04/05/2023 4.4 3.5  - 5.1 mmol/L Final     Chloride   Date Value Ref Range Status   04/05/2023 108 (H) 98 - 107 mmol/L Final     CO2   Date Value Ref Range Status   04/05/2023 28 21 - 32 mmol/L Final     Glucose   Date Value Ref Range Status   04/05/2023 64 (L) 74 - 106 mg/dL Final     BUN   Date Value Ref Range Status   04/05/2023 11 7 - 18 mg/dL Final     Creatinine   Date Value Ref Range Status   04/05/2023 1.15 0.70 - 1.30 mg/dL Final     Calcium   Date Value Ref Range Status   04/05/2023 9.6 8.5 - 10.1 mg/dL Final     Total Protein   Date Value Ref Range Status   04/05/2023 7.8 6.4 - 8.2 g/dL Final     Albumin   Date Value Ref Range Status   04/05/2023 4.3 3.5 - 5.0 g/dL Final     Bilirubin, Total   Date Value Ref Range Status   04/05/2023 0.5 >0.0 - 1.2 mg/dL Final     Alk Phos   Date Value Ref Range Status   04/05/2023 50 45 - 115 U/L Final     AST   Date Value Ref Range Status   04/05/2023 11 (L) 15 - 37 U/L Final     ALT   Date Value Ref Range Status   04/05/2023 19 16 - 61 U/L Final     Anion Gap   Date Value Ref Range Status   04/05/2023 10 7 - 16 mmol/L Final     eGFR   Date Value Ref Range Status   04/05/2023 93 >=60 mL/min/1.73m² Final     Procedures   Assessment and Plan (including Health Maintenance)   :    Plan:     Problem List Items Addressed This Visit    None  Visit Diagnoses       Acute intractable headache, unspecified headache type    -  Primary    Relevant Orders    POCT COVID-19 Rapid Screening (Completed)    POCT Influenza A/B Molecular (Completed)    Sinus problem        Relevant Orders    POCT COVID-19 Rapid Screening (Completed)    POCT Influenza A/B Molecular (Completed)    Abdominal pain, unspecified abdominal location        Relevant Orders    POCT COVID-19 Rapid Screening (Completed)    POCT Influenza A/B Molecular (Completed)    Diarrhea, unspecified type        Relevant Orders    POCT COVID-19 Rapid Screening (Completed)    POCT Influenza A/B Molecular (Completed)    Fever, unspecified fever cause         Relevant Orders    POCT COVID-19 Rapid Screening (Completed)    POCT Influenza A/B Molecular (Completed)    Acute cough        Relevant Orders    POCT COVID-19 Rapid Screening (Completed)    POCT Influenza A/B Molecular (Completed)    Bacterial sinusitis        Relevant Medications    methylPREDNISolone acetate injection 40 mg (Completed)    azithromycin (Z-NICKY) 250 MG tablet        Patient will be treated for bacterial sinusitis today.  Patient admits to eating significant amounts of fast food lately.  That is likely contributing to his diarrhea.  Patient counseled to follow up bland diet.  Patient given work excuse.  Patient advised to follow up in 3 days should symptoms not improve.  Patient counseled at length to call, return to clinic or present to the ED should symptoms persist or worsen.  Patient's signals understanding and agreement with the plan of care.    Health Maintenance Topics with due status: Not Due       Topic Last Completion Date    TETANUS VACCINE 10/05/2022       No future appointments.     Health Maintenance Due   Topic Date Due    Hepatitis C Screening  Never done    Lipid Panel  Never done    Pneumococcal Vaccines (Age 0-64) (1 of 2 - PCV) 07/26/2008    Influenza Vaccine (1) 09/01/2024    COVID-19 Vaccine (1 - 2023-24 season) Never done          Signature:  Doron Urena, [facility]  [address] [address] [place]  [address]    Date of encounter: 9/16/24

## 2024-09-16 NOTE — LETTER
September 16, 2024      [facility]  [address] [place] [address]  Phone: [phone]  Fax: [phone]       Patient: Ba Ewing   YOB: 2000  Date of Visit: 09/16/2024    To Whom It May Concern:    Damaris Ewing  was at [facility] on 09/16/2024. The patient may return to work/school on 09/18/2024 with no restrictions. If you have any questions or concerns, or if I can be of further assistance, please do not hesitate to contact me.    Sincerely,    Calli Mcdermott LPN      No adenopathy

## 2024-11-22 ENCOUNTER — OFFICE VISIT (OUTPATIENT)
Dept: FAMILY MEDICINE | Facility: CLINIC | Age: 24
End: 2024-11-22
Payer: COMMERCIAL

## 2024-11-22 VITALS
DIASTOLIC BLOOD PRESSURE: 80 MMHG | HEIGHT: 77 IN | SYSTOLIC BLOOD PRESSURE: 136 MMHG | OXYGEN SATURATION: 98 % | BODY MASS INDEX: 21.02 KG/M2 | RESPIRATION RATE: 19 BRPM | WEIGHT: 178 LBS | TEMPERATURE: 98 F | HEART RATE: 75 BPM

## 2024-11-22 DIAGNOSIS — Z11.59 SCREENING FOR VIRAL DISEASE: ICD-10-CM

## 2024-11-22 DIAGNOSIS — Z11.3 SCREENING EXAMINATION FOR STD (SEXUALLY TRANSMITTED DISEASE): Primary | ICD-10-CM

## 2024-11-22 LAB
CHLAMYDIA BY PCR: NEGATIVE
N. GONORRHOEAE (GC) BY PCR: NEGATIVE
TRICHOMONAS NAT: NEGATIVE

## 2024-11-22 PROCEDURE — 1160F RVW MEDS BY RX/DR IN RCRD: CPT | Mod: CPTII,,, | Performed by: FAMILY MEDICINE

## 2024-11-22 PROCEDURE — 3008F BODY MASS INDEX DOCD: CPT | Mod: CPTII,,, | Performed by: FAMILY MEDICINE

## 2024-11-22 PROCEDURE — 86803 HEPATITIS C AB TEST: CPT | Mod: ,,, | Performed by: CLINICAL MEDICAL LABORATORY

## 2024-11-22 PROCEDURE — 87661 TRICHOMONAS VAGINALIS AMPLIF: CPT | Mod: ,,, | Performed by: CLINICAL MEDICAL LABORATORY

## 2024-11-22 PROCEDURE — 99213 OFFICE O/P EST LOW 20 MIN: CPT | Mod: ,,, | Performed by: FAMILY MEDICINE

## 2024-11-22 PROCEDURE — 3075F SYST BP GE 130 - 139MM HG: CPT | Mod: CPTII,,, | Performed by: FAMILY MEDICINE

## 2024-11-22 PROCEDURE — 86694 HERPES SIMPLEX NES ANTBDY: CPT | Mod: ,,, | Performed by: CLINICAL MEDICAL LABORATORY

## 2024-11-22 PROCEDURE — 87491 CHLMYD TRACH DNA AMP PROBE: CPT | Mod: ,,, | Performed by: CLINICAL MEDICAL LABORATORY

## 2024-11-22 PROCEDURE — 87591 N.GONORRHOEAE DNA AMP PROB: CPT | Mod: ,,, | Performed by: CLINICAL MEDICAL LABORATORY

## 2024-11-22 PROCEDURE — 3079F DIAST BP 80-89 MM HG: CPT | Mod: CPTII,,, | Performed by: FAMILY MEDICINE

## 2024-11-22 PROCEDURE — 1159F MED LIST DOCD IN RCRD: CPT | Mod: CPTII,,, | Performed by: FAMILY MEDICINE

## 2024-11-22 PROCEDURE — 86780 TREPONEMA PALLIDUM: CPT | Mod: ,,, | Performed by: CLINICAL MEDICAL LABORATORY

## 2024-11-22 PROCEDURE — 87389 HIV-1 AG W/HIV-1&-2 AB AG IA: CPT | Mod: ,,, | Performed by: CLINICAL MEDICAL LABORATORY

## 2024-11-22 NOTE — LETTER
November 22, 2024      [facility]  [address] [place] [address] [place] [address]  Phone: [phone]  Fax: [phone]       Patient: Ba Ewing   YOB: 2000  Date of Visit: 11/22/2024    To Whom It May Concern:    Damaris Ewing  was at [facility] on 11/22/2024. The patient may return to work/school on 11/23/2024 with no restrictions. If you have any questions or concerns, or if I can be of further assistance, please do not hesitate to contact me.    Sincerely,    Calli Mcdermott LPN

## 2024-11-23 LAB
HCV AB SER QL: NORMAL
HIV 1+O+2 AB SERPL QL: NORMAL
SYPHILIS AB INTERPRETATION: NORMAL

## 2024-11-25 NOTE — PROGRESS NOTES
Doron Urena DO   [place] [address]  [place]  [address]      PATIENT NAME: Ba Ewing  : 2000  DATE: 24  MRN: [id number]      Billing Provider: Doron Urena DO  Level of Service:   Patient PCP Information       Provider PCP Type    Ashley Melo NP General            Reason for Visit / Chief Complaint: STD CHECK (Patrick Ewing 23 y/o presents to clinic requesting full panel STD check he denies having any symptoms at this time. )         History of Present Illness / Problem Focused Workflow     HPI    HPI as noted.  Patient denies any fevers, chills, palpitations, dizziness, lightheadedness, dysuria, penile discharge, rash or any other symptoms.  Patient has been sexually active and would like to be tested.    Review of Systems     @Review of Systems   Constitutional:  Negative for chills, diaphoresis and fever.   HENT:  Negative for sore throat.    Eyes:  Negative for visual disturbance.   Respiratory:  Negative for cough and shortness of breath.    Cardiovascular:  Negative for chest pain and palpitations.   Gastrointestinal:  Negative for abdominal pain, diarrhea, nausea and vomiting.   Genitourinary:  Negative for dysuria and hematuria.   Musculoskeletal:  Negative for arthralgias and leg pain.   Integumentary:  Negative for rash.   Neurological:  Negative for dizziness, light-headedness, numbness and headaches.       Medical / Social / Family History     Past Medical History:   Diagnosis Date    Acute prostatitis     Asthma     GERD (gastroesophageal reflux disease)     History of COVID-19 2020       Past Surgical History:   Procedure Laterality Date    DENTAL SURGERY      teeth extracted    MOUTH SURGERY      cyst removed       Medications and Allergies     Medications  No outpatient medications have been marked as taking for the 24 encounter (Office Visit) with Doron Urena DO.       Allergies  Review of patient's allergies indicates:    Allergen Reactions    Amoxicillin-pot clavulanate Other (See Comments) and Diarrhea       Physical Examination     Vitals:    11/22/24 1429   BP: 136/80   Pulse: 75   Resp: 19   Temp: 97.8 °F (36.6 °C)     Physical Exam  Vitals and nursing note reviewed.   Constitutional:       General: He is not in acute distress.     Appearance: He is not ill-appearing, toxic-appearing or diaphoretic.   HENT:      Head: Normocephalic and atraumatic.      Right Ear: External ear normal.      Left Ear: External ear normal.      Nose: Nose normal.      Mouth/Throat:      Pharynx: No oropharyngeal exudate or posterior oropharyngeal erythema.   Eyes:      General: No scleral icterus.        Right eye: No discharge.         Left eye: No discharge.      Extraocular Movements: Extraocular movements intact.   Neck:      Vascular: No carotid bruit.   Cardiovascular:      Rate and Rhythm: Normal rate and regular rhythm.      Pulses: Normal pulses.      Heart sounds: Normal heart sounds. No murmur heard.     No friction rub. No gallop.   Pulmonary:      Effort: Pulmonary effort is normal. No respiratory distress.      Breath sounds: No stridor. No wheezing, rhonchi or rales.   Chest:      Chest wall: No tenderness.   Abdominal:      Palpations: Abdomen is soft.      Tenderness: There is no abdominal tenderness. There is no guarding.   Musculoskeletal:         General: No swelling.      Right lower leg: No edema.      Left lower leg: No edema.   Skin:     General: Skin is warm and dry.   Neurological:      Mental Status: He is alert and oriented to person, place, and time.               Lab Results   Component Value Date    WBC 5.78 04/05/2023    HGB 13.6 04/05/2023    HCT 42.2 04/05/2023    MCV 93.4 04/05/2023     04/05/2023        CMP  Sodium   Date Value Ref Range Status   04/05/2023 142 136 - 145 mmol/L Final     Potassium   Date Value Ref Range Status   04/05/2023 4.4 3.5 - 5.1 mmol/L Final     Chloride   Date Value Ref Range  Status   04/05/2023 108 (H) 98 - 107 mmol/L Final     CO2   Date Value Ref Range Status   04/05/2023 28 21 - 32 mmol/L Final     Glucose   Date Value Ref Range Status   04/05/2023 64 (L) 74 - 106 mg/dL Final     BUN   Date Value Ref Range Status   04/05/2023 11 7 - 18 mg/dL Final     Creatinine   Date Value Ref Range Status   04/05/2023 1.15 0.70 - 1.30 mg/dL Final     Calcium   Date Value Ref Range Status   04/05/2023 9.6 8.5 - 10.1 mg/dL Final     Total Protein   Date Value Ref Range Status   04/05/2023 7.8 6.4 - 8.2 g/dL Final     Albumin   Date Value Ref Range Status   04/05/2023 4.3 3.5 - 5.0 g/dL Final     Bilirubin, Total   Date Value Ref Range Status   04/05/2023 0.5 >0.0 - 1.2 mg/dL Final     Alk Phos   Date Value Ref Range Status   04/05/2023 50 45 - 115 U/L Final     AST   Date Value Ref Range Status   04/05/2023 11 (L) 15 - 37 U/L Final     ALT   Date Value Ref Range Status   04/05/2023 19 16 - 61 U/L Final     Anion Gap   Date Value Ref Range Status   04/05/2023 10 7 - 16 mmol/L Final     eGFR   Date Value Ref Range Status   04/05/2023 93 >=60 mL/min/1.73m² Final     Procedures   Assessment and Plan (including Health Maintenance)   :    Plan:     Problem List Items Addressed This Visit    None  Visit Diagnoses       Screening examination for STD (sexually transmitted disease)    -  Primary    Relevant Orders    HSV 1 & 2, IgM    HIV 1/2 Ag/Ab (4th Gen) (Completed)    Syphilis Antibody with reflex to RPR (Completed)    Trichomonas vaginalis by PCR (Completed)    Chlamydia/GC, PCR (Completed)    Screening for viral disease        Relevant Orders    Hepatitis C Antibody (Completed)          Patient denies any symptoms at this time.  STI workup will be ordered.  Patient will be notified of results.  Continuing management contingent upon findings.  Patient counseled at length to call, return to clinic or present to the ED should symptoms persist or worsen.  Patient signals understanding and agreement with  the plan of care.  Health Maintenance Topics with due status: Not Due       Topic Last Completion Date    TETANUS VACCINE 10/05/2022    RSV Vaccine (Age 60+ and Pregnant patients) Not Due       No future appointments.     Health Maintenance Due   Topic Date Due    Lipid Panel  Never done    Pneumococcal Vaccines (Age 0-64) (1 of 2 - PCV) 07/26/2008    Influenza Vaccine (1) 09/01/2024    COVID-19 Vaccine (1 - 2024-25 season) Never done          Signature:  Doron Urena [address]  [address]    Date of encounter: 11/22/24

## 2024-11-26 LAB — HSV IGM SER QL IA: NEGATIVE
